# Patient Record
Sex: FEMALE | Race: OTHER | NOT HISPANIC OR LATINO | URBAN - METROPOLITAN AREA
[De-identification: names, ages, dates, MRNs, and addresses within clinical notes are randomized per-mention and may not be internally consistent; named-entity substitution may affect disease eponyms.]

---

## 2019-07-01 ENCOUNTER — EMERGENCY (EMERGENCY)
Facility: HOSPITAL | Age: 44
LOS: 1 days | Discharge: HOME | End: 2019-07-01
Attending: EMERGENCY MEDICINE
Payer: COMMERCIAL

## 2019-07-01 VITALS
WEIGHT: 160.06 LBS | HEART RATE: 112 BPM | RESPIRATION RATE: 20 BRPM | HEIGHT: 62 IN | OXYGEN SATURATION: 99 % | DIASTOLIC BLOOD PRESSURE: 85 MMHG | SYSTOLIC BLOOD PRESSURE: 120 MMHG | TEMPERATURE: 98 F

## 2019-07-01 LAB
ALBUMIN SERPL ELPH-MCNC: 4.6 G/DL — SIGNIFICANT CHANGE UP (ref 3.3–5)
ALP SERPL-CCNC: 61 U/L — SIGNIFICANT CHANGE UP (ref 40–120)
ALT FLD-CCNC: 8 U/L — LOW (ref 10–45)
ANION GAP SERPL CALC-SCNC: 12 MMOL/L — SIGNIFICANT CHANGE UP (ref 5–17)
APTT BLD: 27 SEC — LOW (ref 27.5–36.3)
AST SERPL-CCNC: 14 U/L — SIGNIFICANT CHANGE UP (ref 10–40)
BASOPHILS # BLD AUTO: 0 K/UL — SIGNIFICANT CHANGE UP (ref 0–0.2)
BASOPHILS NFR BLD AUTO: 0.1 % — SIGNIFICANT CHANGE UP (ref 0–2)
BILIRUB SERPL-MCNC: 0.5 MG/DL — SIGNIFICANT CHANGE UP (ref 0.2–1.2)
BLD GP AB SCN SERPL QL: NEGATIVE — SIGNIFICANT CHANGE UP
BUN SERPL-MCNC: 10 MG/DL — SIGNIFICANT CHANGE UP (ref 7–23)
CALCIUM SERPL-MCNC: 9.6 MG/DL — SIGNIFICANT CHANGE UP (ref 8.4–10.5)
CHLORIDE SERPL-SCNC: 105 MMOL/L — SIGNIFICANT CHANGE UP (ref 96–108)
CK MB CFR SERPL CALC: 1 NG/ML — SIGNIFICANT CHANGE UP (ref 0–3.8)
CK SERPL-CCNC: 44 U/L — SIGNIFICANT CHANGE UP (ref 25–170)
CO2 SERPL-SCNC: 22 MMOL/L — SIGNIFICANT CHANGE UP (ref 22–31)
CREAT SERPL-MCNC: 0.73 MG/DL — SIGNIFICANT CHANGE UP (ref 0.5–1.3)
EOSINOPHIL # BLD AUTO: 0 K/UL — SIGNIFICANT CHANGE UP (ref 0–0.5)
EOSINOPHIL NFR BLD AUTO: 0.3 % — SIGNIFICANT CHANGE UP (ref 0–6)
GLUCOSE SERPL-MCNC: 141 MG/DL — HIGH (ref 70–99)
HCG SERPL-ACNC: <2 MIU/ML — SIGNIFICANT CHANGE UP
HCT VFR BLD CALC: 37.7 % — SIGNIFICANT CHANGE UP (ref 34.5–45)
HGB BLD-MCNC: 12.8 G/DL — SIGNIFICANT CHANGE UP (ref 11.5–15.5)
INR BLD: 1 RATIO — SIGNIFICANT CHANGE UP (ref 0.88–1.16)
LYMPHOCYTES # BLD AUTO: 1 K/UL — SIGNIFICANT CHANGE UP (ref 1–3.3)
LYMPHOCYTES # BLD AUTO: 12.6 % — LOW (ref 13–44)
MCHC RBC-ENTMCNC: 30.5 PG — SIGNIFICANT CHANGE UP (ref 27–34)
MCHC RBC-ENTMCNC: 34 GM/DL — SIGNIFICANT CHANGE UP (ref 32–36)
MCV RBC AUTO: 89.7 FL — SIGNIFICANT CHANGE UP (ref 80–100)
MONOCYTES # BLD AUTO: 0.5 K/UL — SIGNIFICANT CHANGE UP (ref 0–0.9)
MONOCYTES NFR BLD AUTO: 6.8 % — SIGNIFICANT CHANGE UP (ref 2–14)
NEUTROPHILS # BLD AUTO: 6.2 K/UL — SIGNIFICANT CHANGE UP (ref 1.8–7.4)
NEUTROPHILS NFR BLD AUTO: 80.2 % — HIGH (ref 43–77)
PLATELET # BLD AUTO: 466 K/UL — HIGH (ref 150–400)
POTASSIUM SERPL-MCNC: 4.1 MMOL/L — SIGNIFICANT CHANGE UP (ref 3.5–5.3)
POTASSIUM SERPL-SCNC: 4.1 MMOL/L — SIGNIFICANT CHANGE UP (ref 3.5–5.3)
PROT SERPL-MCNC: 7 G/DL — SIGNIFICANT CHANGE UP (ref 6–8.3)
PROTHROM AB SERPL-ACNC: 11.5 SEC — SIGNIFICANT CHANGE UP (ref 10–12.9)
RBC # BLD: 4.21 M/UL — SIGNIFICANT CHANGE UP (ref 3.8–5.2)
RBC # FLD: 14.1 % — SIGNIFICANT CHANGE UP (ref 10.3–14.5)
RH IG SCN BLD-IMP: POSITIVE — SIGNIFICANT CHANGE UP
SODIUM SERPL-SCNC: 139 MMOL/L — SIGNIFICANT CHANGE UP (ref 135–145)
TROPONIN T, HIGH SENSITIVITY RESULT: <6 NG/L — SIGNIFICANT CHANGE UP (ref 0–51)
WBC # BLD: 7.7 K/UL — SIGNIFICANT CHANGE UP (ref 3.8–10.5)
WBC # FLD AUTO: 7.7 K/UL — SIGNIFICANT CHANGE UP (ref 3.8–10.5)

## 2019-07-01 PROCEDURE — 99218: CPT

## 2019-07-01 PROCEDURE — 70496 CT ANGIOGRAPHY HEAD: CPT | Mod: 26

## 2019-07-01 PROCEDURE — 99283 EMERGENCY DEPT VISIT LOW MDM: CPT

## 2019-07-01 PROCEDURE — 70498 CT ANGIOGRAPHY NECK: CPT | Mod: 26

## 2019-07-01 PROCEDURE — 93010 ELECTROCARDIOGRAM REPORT: CPT

## 2019-07-01 PROCEDURE — 70450 CT HEAD/BRAIN W/O DYE: CPT | Mod: 26,59

## 2019-07-01 RX ORDER — ONDANSETRON 8 MG/1
4 TABLET, FILM COATED ORAL ONCE
Refills: 0 | Status: COMPLETED | OUTPATIENT
Start: 2019-07-01 | End: 2019-07-01

## 2019-07-01 RX ORDER — SODIUM CHLORIDE 9 MG/ML
1000 INJECTION, SOLUTION INTRAVENOUS ONCE
Refills: 0 | Status: COMPLETED | OUTPATIENT
Start: 2019-07-01 | End: 2019-07-01

## 2019-07-01 RX ORDER — ACETAMINOPHEN 500 MG
975 TABLET ORAL ONCE
Refills: 0 | Status: COMPLETED | OUTPATIENT
Start: 2019-07-01 | End: 2019-07-01

## 2019-07-01 RX ORDER — DIPHENHYDRAMINE HCL 50 MG
50 CAPSULE ORAL ONCE
Refills: 0 | Status: COMPLETED | OUTPATIENT
Start: 2019-07-01 | End: 2019-07-01

## 2019-07-01 RX ORDER — KETOROLAC TROMETHAMINE 30 MG/ML
15 SYRINGE (ML) INJECTION ONCE
Refills: 0 | Status: DISCONTINUED | OUTPATIENT
Start: 2019-07-01 | End: 2019-07-01

## 2019-07-01 RX ORDER — METOCLOPRAMIDE HCL 10 MG
10 TABLET ORAL ONCE
Refills: 0 | Status: COMPLETED | OUTPATIENT
Start: 2019-07-01 | End: 2019-07-01

## 2019-07-01 RX ORDER — SODIUM CHLORIDE 9 MG/ML
3 INJECTION INTRAMUSCULAR; INTRAVENOUS; SUBCUTANEOUS EVERY 8 HOURS
Refills: 0 | Status: DISCONTINUED | OUTPATIENT
Start: 2019-07-01 | End: 2019-07-05

## 2019-07-01 RX ADMIN — Medication 10 MILLIGRAM(S): at 20:17

## 2019-07-01 RX ADMIN — Medication 15 MILLIGRAM(S): at 20:51

## 2019-07-01 RX ADMIN — ONDANSETRON 4 MILLIGRAM(S): 8 TABLET, FILM COATED ORAL at 18:50

## 2019-07-01 RX ADMIN — SODIUM CHLORIDE 1000 MILLILITER(S): 9 INJECTION, SOLUTION INTRAVENOUS at 20:23

## 2019-07-01 RX ADMIN — Medication 15 MILLIGRAM(S): at 20:18

## 2019-07-01 RX ADMIN — SODIUM CHLORIDE 1000 MILLILITER(S): 9 INJECTION, SOLUTION INTRAVENOUS at 19:02

## 2019-07-01 RX ADMIN — Medication 50 MILLIGRAM(S): at 20:18

## 2019-07-01 RX ADMIN — SODIUM CHLORIDE 3 MILLILITER(S): 9 INJECTION INTRAMUSCULAR; INTRAVENOUS; SUBCUTANEOUS at 23:12

## 2019-07-01 RX ADMIN — Medication 975 MILLIGRAM(S): at 19:02

## 2019-07-01 RX ADMIN — Medication 975 MILLIGRAM(S): at 20:17

## 2019-07-01 NOTE — ED CDU PROVIDER INITIAL DAY NOTE - NEUROLOGICAL MOTOR PLANTAR LEFT
4/5 MOVEMENT AGAINST RESISTANCE, LESS THAN NORMAL. 3/5 MOVEMENT AGAINST GRAVITY, BUT NOT AGAINST ADDED RESISTANCE.

## 2019-07-01 NOTE — ED CDU PROVIDER INITIAL DAY NOTE - ATTENDING CONTRIBUTION TO CARE
Attending MD Conti:   I personally have seen and examined this patient.  Physician assistant note reviewed and agree on plan of care and except where noted.  See HPI, PE, and MDM for details.

## 2019-07-01 NOTE — ED CDU PROVIDER INITIAL DAY NOTE - DETAILS
R/o CVA  1. Frequent reevaluations  2. Telemetry  3. Neuro checks  4. MRI head no cont  5. Neuro following  Plan d/w Dr. Conti

## 2019-07-01 NOTE — ED CDU PROVIDER DISPOSITION NOTE - CLINICAL COURSE
44 YOF pmh migraines, p/w Left sided weakness and slurred speech. Per family member pt was last seen normal 10pm last night. Around 430pm pt speech worsened and became slurred. Pt endorses a diffuse headache since this morning and feels slightly confused. Pt also endorses weakness in left arm and left leg. Pt denies fever, chills cough. Headache started this morning and has progressively worsened. Pt states this is not like her typical headache. Code stroke activated as pt has left arm drift and left sided weakness.  In ED, patient had Head CT w/o cont and CT angio head and neck negative for acute pathology. Pt was evaluated by Neurology and sent to CDU for frequent reeval, vitals q 4hrs, pain control, neuro checks, MRI head w.o contrast. 44 YOF pmh migraines, p/w Left sided weakness and slurred speech. Per family member pt was last seen normal 10pm last night. Around 430pm pt speech worsened and became slurred. Pt endorses a diffuse headache since this morning and feels slightly confused. Pt also endorses weakness in left arm and left leg. Pt denies fever, chills cough. Headache started this morning and has progressively worsened. Pt states this is not like her typical headache. Code stroke activated as pt has left arm drift and left sided weakness.  In ED, patient had Head CT w/o cont and CT angio head and neck negative for acute pathology. Pt was evaluated by Neurology and sent to CDU for frequent reeval, vitals q 4hrs, pain control, neuro checks, MRI head w.o contrast. MRI unremarkable. Pt ambulatory in CDU without any difficulty. Pt re-evaluate by neurology team and attending who report patient's exam and weakness much improved from initial evaluate. Neuro recommending medrol dose pack and eletriptan for headache. 44 YOF pmh migraines, p/w Left sided weakness and slurred speech. Per family member pt was last seen normal 10pm last night. Around 430pm pt speech worsened and became slurred. Pt endorses a diffuse headache since this morning and feels slightly confused. Pt also endorses weakness in left arm and left leg. Pt denies fever, chills cough. Headache started this morning and has progressively worsened. Pt states this is not like her typical headache. Code stroke activated as pt has left arm drift and left sided weakness.  In ED, patient had Head CT w/o cont and CT angio head and neck negative for acute pathology. Pt was evaluated by Neurology and sent to CDU for frequent reeval, vitals q 4hrs, pain control, neuro checks, MRI head w.o contrast. MRI unremarkable. Pt ambulatory in CDU without any difficulty. Pt re-evaluate by neurology team and attending who report patient's exam and weakness much improved from initial evaluation. Neuro recommending medrol dose pack and eletriptan for headache, outpatient neuro f/u.

## 2019-07-01 NOTE — ED PROVIDER NOTE - CLINICAL SUMMARY MEDICAL DECISION MAKING FREE TEXT BOX
Attending MD Conti: 44F presenting with  nausea and left sided weakness, initial last known normal was stated 430PM however further collateral from friend at bedside notes LKN 10pm the night prior. Exam does reveal slowed speech and left sided weakness, code stroke was activated in triage. Stroke team evaluating patient, plan for CT, CTA head and neck, disposition pending stroke team evaluation and neuro-imaging.

## 2019-07-01 NOTE — CONSULT NOTE ADULT - SUBJECTIVE AND OBJECTIVE BOX
HPI: 45yo woman with history of migraines with aura presenting with 1 day of headache. Step sister was at bedside and contributed to history. Patient was last well known at [facility] on . This morning 8a patient complained to step sister that she was having the "worst headache of her life" with associated nausea and 1 episode of vomiting. Patient did not  step sister's daughter, prompting step sister to call patient, who noted patient to have some slowed speech. Patient reports headache did not resolve with a dose of oral prednisone (prescribed by her PCP for headaches) and imitrex (reports this is old prescription that she has not taken in over a month, from her Neurologist that she has not seen in several months).    Reports that she has a history of migraines with auras that involve either the left or right part of her head sometimes focused around either eye with a "funny feeling" that she believes to be an aura and has visual of white light. She explained that medications she has tried in the past have not helped, including imitrex.    REVIEW OF SYSTEMS  General: + fatigue	  Skin: denies rashes	  Ophthalmologic: denies blurry or double vision  ENMT: denies excessive lacrimation	  Respiratory and Thorax:	denies shortness of breath  Cardiovascular:	denies cp  Gastrointestinal:	+ nausea, vomiting  Genitourinary: denies dysuria	  Musculoskeletal:	 see neurological  Neurological: + left sided weakness		  	  A 10-system ROS was performed and is negative except for those items noted above and/or in the HPI.    PAST MEDICAL & SURGICAL HISTORY:  C/S age 16    FAMILY HISTORY:  father,  at 42 from stroke    SOCIAL HISTORY:   T/E/D: no tobacco use, no alcohol use, no illicit drug use  Occupation: ER nurse at [employer]    MEDICATIONS (HOME):  Home Medications:  prednisone, PRN for headaches - she takes this on occasion  imitrex for migraines nasal spray - she reports this was an old prescription and that imitrex in the past has not worked for her migraines    MEDICATIONS  (STANDING):  sodium chloride 0.9% lock flush 3 milliLiter(s) IV Push every 8 hours    MEDICATIONS  (PRN):    ALLERGIES/INTOLERANCES:  Allergies  No Known Allergies    Intolerances    VITALS & EXAMINATION:  Vital Signs Last 24 Hrs  T(C): 36.8 (2019 17:44), Max: 36.8 (2019 17:44)  T(F): 98.2 (2019 17:44), Max: 98.2 (2019 17:44)  HR: 112 (2019 17:44) (112 - 112)  BP: 120/85 (2019 17:44) (120/85 - 120/85)  BP(mean): --  RR: 20 (2019 17:44) (20 - 20)  SpO2: 99% (2019 17:44) (99% - 99%)    General:  Constitutional: Obese Female, appears stated age, in no apparent distress including pain  Head: Normocephalic & atraumatic.  ENT: Patent ear canals, intact TM, mucus membranes moist & pink, neck supple, no lymphadenopathy.   Respiratory: Patent airway. All lung fields are clear to auscultation bilaterally.  Extremities: No cyanosis, clubbing, or edema.  Skin: No rashes, bruising, or discoloration.    Cardiovascular (>2): RRR no murmurs. Carotid pulsations symmetric, no bruits. Normal capillary beds refill, 1-2 seconds or less.     Neurological (>12):  NIHSS: 2 for left arm and left leg drift    MS: Awake, alert, oriented to person, place, situation, time. Normal affect. Follows all commands.    Language: Speech is clear, fluent with good repetition & comprehension. noted speech to be slowed    CNs: PERRLA (R = 3mm, L = 3mm). VFF. EOMI, no nystagmus, no diplopia. V1-3 intact to LT/pinprick, well developed masseter muscles b/l. No facial asymmetry b/l, full eye closure strength b/l. Hearing grossly normal (rubbing fingers) b/l. Symmetric palate elevation in midline. Gag reflex deferred. Head turning & shoulder shrug intact b/l. Tongue midline, normal movements, no atrophy.    Fundoscopic: not performed    Motor: Normal muscle bulk & tone. No noticeable tremor or seizure. No pronator drift.              Deltoid	Biceps	Triceps	Wrist	Finger ABd	   R	5	5	5	5	5		5 	  L	3	3	3	3	3		3    	H-Flex	H-Ext	H-ABd	H-ADd	K-Flex	K-Ext	D-Flex	P-Flex  R	5	5	5	5	5	5	5	5 	   L	3	3	3	3	3	3	3	3	     Sensation: Intact to LT/PP/Temp/Vibration/Position b/l throughout.     Cortical: Extinction on DSS (neglect): none    Reflexes:              Biceps(C5)       BR(C6)     Triceps(C7)               Patellar(L4)    Achilles(S1)    Plantar Resp  R	2	          2	             2		        2		    2		Down   L	2	          2	             2		        2		    2		Down     Coordination: intact rapid-alt movements. No dysmetria to FTN/HTS    Gait: not performed    LABORATORY:  CBC                       12.8   7.7   )-----------( 466      ( 2019 18:28 )             37.7     Chem 07-    139  |  105  |  10  ----------------------------<  141<H>  4.1   |  22  |  0.73    Ca    9.6      2019 18:28    TPro  7.0  /  Alb  4.6  /  TBili  0.5  /  DBili  x   /  AST  14  /  ALT  8<L>  /  AlkPhos  61      LFTs LIVER FUNCTIONS - ( 2019 18:28 )  Alb: 4.6 g/dL / Pro: 7.0 g/dL / ALK PHOS: 61 U/L / ALT: 8 U/L / AST: 14 U/L / GGT: x           Coagulopathy PT/INR - ( 2019 18:28 )   PT: 11.5 sec;   INR: 1.00 ratio         PTT - ( 2019 18:28 )  PTT:27.0 sec  Lipid Panel   A1c   Cardiac enzymes CARDIAC MARKERS ( 2019 18:28 )  x     / x     / 44 U/L / x     / 1.0 ng/mL      U/A   CSF  Immunological  Other    STUDIES & IMAGING:  Studies (EKG, EEG, EMG, etc):     Radiology (XR, CT, MR, U/S, TTE/ASHLEY):  < from: CT Brain Stroke Protocol (19 @ 18:06) >  IMPRESSION:     CT brain: No acute intracranial hemorrhage, mass effect, or midline   shift. No abnormal contrast enhancement.    CT angiography neck: No evidence of hemodynamically significant stenosis   by NASCET criteria. No evidence of vascular dissection.    CT angiography brain: No major vessel occlusion or proximal stenosis by   NASCET criteria. No evidence of aneurysm or other vascular malformation.    These findings were discussed with Dr. Fernandes at 2019 6:20 PM by Dr. Raymond with read back confirmation.     < from: CT Angio Head w/ IV Cont (19 @ 18:06) >  IMPRESSION:     CT brain: No acute intracranial hemorrhage, mass effect, or midline   shift. No abnormal contrast enhancement.    CT angiography neck: No evidence of hemodynamically significant stenosis   by NASCET criteria. No evidence of vascular dissection.    CT angiography brain: No major vessel occlusion or proximal stenosis by   NASCET criteria. No evidence of aneurysm or other vascular malformation.    These findings were discussed with Dr. Fernandes at 2019 6:20 PM by Dr. Raymond with read back confirmation.

## 2019-07-01 NOTE — ED PROVIDER NOTE - NS ED ROS FT
CONSTITUTIONAL: No fevers, no chills  Eyes: no visual changes  Ears: no ear drainage, no ear pain  Nose: no nasal congestion  Mouth/Throat: no sore throat  Cardiovascular: No Chest pain  Respiratory: No SOB  Gastrointestinal: No n/v/d, no abd pain  Genitourinary: no dysuria, no hematuria  SKIN: no rashes.  NEURO: +weakness, +headache

## 2019-07-01 NOTE — ED CDU PROVIDER DISPOSITION NOTE - PROVIDER TOKENS
PROVIDER:[TOKEN:[[id number]],FOLLOWUP:[1-3 Days]],PROVIDER:[TOKEN:[[id number]],FOLLOWUP:[1-3 Days]] PROVIDER:[TOKEN:[[id number]],FOLLOWUP:[1-3 Days]]

## 2019-07-01 NOTE — ED CDU PROVIDER DISPOSITION NOTE - PLAN OF CARE
As recommended by Neurology..............  Follow up with your Primary Care Physician within the next 2-3 days  Bring a copy of your test results with you to your appointment  Return to the Emergency Room if you experience new or worsening symptoms

## 2019-07-01 NOTE — ED CDU PROVIDER INITIAL DAY NOTE - OBJECTIVE STATEMENT
44 YOF pmh migraines, p/w Left sided weakness and slurred speech. Per family member pt was last seen normal 10pm last night. Around 430pm pt speech worsened and became slurred. Pt endorses a diffuse headache since this morning and feels slightly confused. Pt also endorses weakness in left arm and left leg. Pt denies fever, chills cough. Headache started this morning and has progressively worsened. Pt states this is not like her typical headache. Code stroke activated as pt has left arm drift and left sided weakness.  In ED, patient had Head CT w/o cont and CT angio head and neck negative for acute pathology. Pt was evaluated by Neurology and sent to CDU for frequent reeval, vitals q 4hrs, pain control, neuro checks, MRI head w.o contrast.

## 2019-07-01 NOTE — ED CDU PROVIDER DISPOSITION NOTE - ATTENDING CONTRIBUTION TO CARE
Patient with hemiplegia and migraine consistent with hemiplegic migraine after negative MRI and improving symptoms, patient to start steroids and eletriptan as per neurology recommendations.  Patient serially evaluated throughout emergency department course. There was no acute deterioration up to this time in the department. Patient has demonstrated marked clinical improvement, feels better at this time according to emergency department team. Agree with goals/plan of emergency department care as described in electronic medical record, including diagnostics, therapeutics and consultation as clinically warranted. Will discharge home with close outpatient followup with primary care physician/provider and specialist if necessary. Patient educated on concerning signs and features to return to the emergency department including but not limited to: nausea, vomiting, fever, chills, persistent/worsening symptoms or any concerns at all.  No immediate life threatening issues present on history or clinical exam. Patient is a safe disposition home, has capacity and insight into their condition, is ambulatory in the Emergency Department with no further questions and will follow up with their doctor(s) this week. Patient understands anticipatory guidance and was given strict return and follow up precautions. The patient has been informed of all concerning signs and symptoms to return to Emergency Department, the necessity to follow up with the PMD/Clinic/follow up provided within 2-3 days was explained, and the patient reports understanding of above with capacity and insight.

## 2019-07-01 NOTE — CONSULT NOTE ADULT - ATTENDING COMMENTS
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     Exam with mild photophobia;  cn 2-12 currently intact.  no focal weakness except ? mild left hand drift.  No other sensory change or cerebellar dysfunction.    Impression: left sided weakness associated migraine  MRI without acute findings  steroid taper  consider change in triptan to Relpax

## 2019-07-01 NOTE — ED PROVIDER NOTE - PROGRESS NOTE DETAILS
Attending MD Conti: neurology recommends CDU for MR brain. CT/CTA negative for acute pathology CTs unremarkable, pt strength improving, headache improving. Speech now back to baseline. Will cdu for MRI.

## 2019-07-01 NOTE — ED CDU PROVIDER DISPOSITION NOTE - NSFOLLOWUPCLINICS_GEN_ALL_ED_FT
[facility]  Neurology  [address] [place] [address] [address] [place] [address]  Phone: [phone]  Fax:   Follow Up Time: 1-3 Days

## 2019-07-01 NOTE — ED CDU PROVIDER DISPOSITION NOTE - CARE PROVIDERS DIRECT ADDRESSES
[email or web address]

## 2019-07-01 NOTE — ED CDU PROVIDER DISPOSITION NOTE - CARE PROVIDER_API CALL
Chong Long (MD)  Neurology; Pain Medicine; Psychiatry  [address] [facility] [facility] [address]  [place] [address]  Phone: [phone]  Fax: [phone]  Follow Up Time: 1-3 Days    Dominick Ruth)  Internal Medicine  [address] [place] [address]  Phone: [phone]  Fax: [phone]  Follow Up Time: 1-3 Days Chong Long (MD)  Neurology; Pain Medicine; Psychiatry  [address] [facility] [facility] [address]  [place] [address]  Phone: [phone]  Fax: [phone]  Follow Up Time: 1-3 Days

## 2019-07-01 NOTE — ED PROVIDER NOTE - OBJECTIVE STATEMENT
44 YOF pmh migraines, p/w Left sided weakness and slurred speech. Per family member pt was last seen normal 10pm last night. Around 430pm pt speech worsened and became slurred. Pt endorses a diffuse headache since this morning and feels slightly confused. Pt also endorses weakness in left arm and left leg. Pt denies fever, chills cough. Headache started this morning and has progressively worsened. Pt states this is not like her typical headache. Code stroke activated as pt has left arm drift and left sided weakness.

## 2019-07-01 NOTE — ED PROVIDER NOTE - ATTENDING CONTRIBUTION TO CARE
Attending MD Conti:  I personally have seen and examined this patient.  Resident note reviewed and agree on plan of care and except where noted.  See HPI, PE, and MDM for details.

## 2019-07-01 NOTE — ED CDU PROVIDER INITIAL DAY NOTE - PROGRESS NOTE DETAILS
CDU PROGRESS NOTE TOMMY ROSALES: Pt resting comfortably, aox3, speaking coherently, NAD, VSS. No events on telemetry. Neuro exam + left arm and left leg drift with 3/5 strength. Neurology aware, Will continue to monitor, plan for MRI without contrast brain.

## 2019-07-01 NOTE — CONSULT NOTE ADULT - ASSESSMENT
Assessment: 45yo woman with history of migraines with auras presenting with one day of severe headache with associated left sided weakness and nausea and vomiting admitted to CDU for hemiplegic migraine versus ischemic stroke.    Impression: left sided weakness 2/2 hemiplegic migraine versus ischemic stroke      Plan:  Imaging/Studies:   MRI brain w/o IV contrast     Meds: Reglan PRN for nausea  Tylenol PRN for headache    Recommendations:  -MRI brain w/o IV contrast  -Reglan for nausea  -Tylenol for headache      -Management & disposition to be discussed with neuro attending, Dr. Long, in AM

## 2019-07-01 NOTE — ED ADULT NURSE NOTE - OBJECTIVE STATEMENT
45 y/o female presents to ed c/o disorientation since 10 pm yesterday. 43 y/o female presents to ed c/o disorientation, left arm weakness and slurred speech since 10 pm yesterday. C/o nausea. Denies chest pain, sob, ha, n/v/d, abdominal pain, f/c, urinary symptoms, hematuria. A&Ox4, vss, skin warm dry and intact, MAEx4, lungs CTA, abd soft nondistended. Pt on presentation had slurred speech, no longer. Patient's bed in the lowest position, explained plan of care to patient and family members. Will continue to reassess.

## 2019-07-01 NOTE — ED CDU PROVIDER DISPOSITION NOTE - NSFOLLOWUPINSTRUCTIONS_ED_ALL_ED_FT
Stay well hydrated.  As recommended by Neurology for headache, take Eletriptan 20mg tabs orally as directed: 1-2 tabs as a single dose (max: 40 mg/dose); if the headache improves but returns, dose may be repeated after 2 hours (max: 80 mg/day).  Please also take Medrol dosepack as prescribed.  Please follow up with Neurologist, Dr. Long, upon discharge.   Follow up with your primary care provider.   Return to ER for any new/worsening headache, vision changes, numbness/tingling, weakness, trouble walking/speaking/swallowing, or any other concerns. Stay well hydrated.  As recommended by Neurology for headache, take Eletriptan 20mg tabs orally as directed: 1-2 tabs as a single dose (max: 40 mg/dose); if the headache improves but returns, dose may be repeated after 2 hours (max: 80 mg/day).  Please also take Medrol dosepack as prescribed.  Please follow up with Neurologist, Dr. Long, upon discharge. Information provided.  Follow up with your primary care provider.   Return to ER for any new/worsening headache, vision changes, numbness/tingling, weakness, trouble walking/speaking/swallowing, or any other concerns.

## 2019-07-02 VITALS
OXYGEN SATURATION: 99 % | TEMPERATURE: 98 F | HEART RATE: 70 BPM | RESPIRATION RATE: 16 BRPM | SYSTOLIC BLOOD PRESSURE: 106 MMHG | DIASTOLIC BLOOD PRESSURE: 73 MMHG

## 2019-07-02 PROCEDURE — 70551 MRI BRAIN STEM W/O DYE: CPT | Mod: 26

## 2019-07-02 PROCEDURE — 99217: CPT

## 2019-07-02 RX ORDER — SUMATRIPTAN SUCCINATE 4 MG/.5ML
50 INJECTION, SOLUTION SUBCUTANEOUS ONCE
Refills: 0 | Status: COMPLETED | OUTPATIENT
Start: 2019-07-02 | End: 2019-07-02

## 2019-07-02 RX ORDER — ELETRIPTAN HYDROBROMIDE 20 MG/1
1 TABLET, FILM COATED ORAL
Qty: 10 | Refills: 0
Start: 2019-07-02

## 2019-07-02 RX ORDER — ACETAMINOPHEN 500 MG
975 TABLET ORAL ONCE
Refills: 0 | Status: COMPLETED | OUTPATIENT
Start: 2019-07-02 | End: 2019-07-02

## 2019-07-02 RX ORDER — KETOROLAC TROMETHAMINE 30 MG/ML
15 SYRINGE (ML) INJECTION ONCE
Refills: 0 | Status: DISCONTINUED | OUTPATIENT
Start: 2019-07-02 | End: 2019-07-02

## 2019-07-02 RX ADMIN — Medication 975 MILLIGRAM(S): at 08:41

## 2019-07-02 RX ADMIN — SODIUM CHLORIDE 3 MILLILITER(S): 9 INJECTION INTRAMUSCULAR; INTRAVENOUS; SUBCUTANEOUS at 06:11

## 2019-07-02 RX ADMIN — Medication 15 MILLIGRAM(S): at 08:40

## 2019-07-02 RX ADMIN — SUMATRIPTAN SUCCINATE 50 MILLIGRAM(S): 4 INJECTION, SOLUTION SUBCUTANEOUS at 12:46

## 2019-07-02 RX ADMIN — Medication 60 MILLIGRAM(S): at 12:41

## 2019-07-02 NOTE — ED CDU PROVIDER SUBSEQUENT DAY NOTE - HISTORY
CDU PROGRESS NOTE PA BOBBY: No interval change from previous note. Pt resting comfortably, aox3, speaking coherently, NAD, VSS. No events on telemetry. Neuro exam + left arm and left leg drift with 3/5 strength. Neurology aware, Will continue to monitor, plan for MRI without contrast brain.

## 2019-07-02 NOTE — ED CDU PROVIDER SUBSEQUENT DAY NOTE - MEDICAL DECISION MAKING DETAILS
See observation monitoring plan section from initial day note.  left arm and left leg 4+/5, normal speech without errors or dysarthria  patient has MRI pending today, history of migraines and headache currently mildly improved.

## 2019-07-02 NOTE — ED CDU PROVIDER SUBSEQUENT DAY NOTE - ATTENDING CONTRIBUTION TO CARE
Patient  understands anticipatory guidance and was given strict return and follow up precautions. The patient has been informed of all concerning signs and symptoms to return to Emergency Department, the necessity to follow up with PMD/Clinic/follow up provided within 2-3 days was explained, and the patient reports understanding of above with capacity and insight if patient disposition is to be home.

## 2019-07-02 NOTE — ED ADULT NURSE REASSESSMENT NOTE - NURSING NEURO LEVEL OF CONSCIOUSNESS
alert and awake/follows commands

## 2019-07-02 NOTE — ED ADULT NURSE REASSESSMENT NOTE - NSIMPLEMENTINTERV_GEN_ALL_ED
Implemented All Universal Safety Interventions:  [place] to call system. Call bell, personal items and telephone within reach. Instruct patient to call for assistance. Room bathroom lighting operational. Non-slip footwear when patient is off stretcher. Physically safe environment: no spills, clutter or unnecessary equipment. Stretcher in lowest position, wheels locked, appropriate side rails in place.

## 2019-07-02 NOTE — ED ADULT NURSE REASSESSMENT NOTE - NS ED NURSE REASSESS COMMENT FT1
13.00 Pt is reviewed by CDU MD Sade Anand  with all the results. Pt continues to have headache medicated  as per order. Pt do not want to wait for further observation.   pt is discharged ML out.  TOMMY Ordoñez explained the follow up care & gave the discharge summary. Pt verbalized the understanding on follow up care . Pt stable to go home.
Pt AO4. Neuro check intact except 3+ weakness noted on L arm & L leg. Pt states headache is present but not as bad as previously. No other deficits noted. Safety maintained. Will continue to monitor.
Pt AO4. Neuro check intact except 4/5 L sided weakness noted. No drift noted on L arm and pt able to hold L leg with more ease although drift noted. Pt states headache is present but not severe. Pt states she feels like weakness has improved tremendously. No other deficits noted. Safety maintained. Will continue to monitor.
Pt received from RODNEY Banda. Pt oriented to CDU & plan of care was discussed. Pt AO4. Neuro check intact except pt states she is experiencing weakness throughout the body but more on L side. Poor effort noted during neuro check. 2+/5 weakness noted in L arm & L leg. Drift noted in L arm & L leg but pt had difficulty bringing L leg up and keeping it elevated. Pt denies any headache at the moment. Pt denies any blurry/ double vision or any other symptoms. PA Amish aware. No other deficits noted. Safety & comfort measures maintained. Call bell in reach. Will continue to monitor.
07.00 Am  Pt received from RODNEY Cee pt is observed for acute headache & left sided weakness. pt is here for MRI.   09.00  Am  Pt reassessed after the MRI Pt oriented to CDU & plan of care was discussed. Pt is  AXOX4. Neuro check intact except pt states she is feeling better with weakness but has severe headache on left side more on temporal region.  Pt medicated as per order .denies any blurry/ double vision N/V/D fever chills CP SOB . Safety & comfort measures maintained. Call bell in reach. Pt advised to call for help if needed IV  site looks clean &Dry no signs of infiltration noted  Will continue to monitor.

## 2019-07-02 NOTE — ED CDU PROVIDER SUBSEQUENT DAY NOTE - PROGRESS NOTE DETAILS
Tried calling pt several times and finally spoke to pt and asked if he was able to send a photo of the bx proven skin cancer BCC left posterior neck. Pt stated that he has a lot going on and will get it to us as soon as possible. Tried giving pt my email address to send photo but pt stated that he has Ryann email and will send it to her.   Pt taken to MRI during signout prior to my evaluation. Will evaluate upon pt's return to CDU. Pt seen and evaluated at bedside upon arrival back to CDU. Pt reports sharp L frontotemporal HA similar to yesterday. Reports she still feels LUE is weaker than RUE, however feels improved. Denies any vision changes, numbness/tingling, unsteady gait. Exam remarkable for decreased strength LUE 4/5 and mild L pronator drift; RUE and b/l lower extremity strength Pt seen and evaluated at bedside upon arrival back to CDU. Pt reports sharp L frontotemporal HA similar to yesterday, reports "nothing has helped." Reports she still feels LUE/LLL is weaker than R side, however feels weakness has improved. Denies any vision changes, numbness/tingling, unsteady gait. VSS. No events on tele. Exam remarkable for decreased strength LUE and LLE 4/5 and mild L pronator drift; RUE/RLE extremity strength 5/5.  Will give tylenol/toradol and re-evaluate. Neuro team at bedside. Reports MRI WNL. Per neuro, will give final recs when MRI report is back. Pt reports persistent HA and wishes to go home. Pt ambulatory in ED without difficulty. Spoke with neuro resident who reports pt's exam and L sided weakness gradually improved since initial evaluation yesterday. MRI unremarkable. Neuro recommending prednisone 60mg now and d/c with medrol dosepack, also recommending eletriptan which is not carried at [facility] so ok with imitrex dose here. Pt seen and evaluated at bedside upon arrival back to CDU. Pt reports sharp L frontotemporal HA similar to yesterday, reports "nothing has helped." Reports she still feels LUE/LLL is weaker than R side, however feels weakness has improved. Denies any vision changes, numbness/tingling, unsteady gait. VSS. No events on tele. Exam remarkable for decreased strength LUE and LLE 4/5 and mild L pronator drift; RUE/RLE extremity strength 5/5. Speech clear. Will give tylenol/toradol and re-evaluate. Neuro team at bedside. Reports MRI reviewed and WNL. Per neuro, will give final recs when MRI report is back. MRI unremarkable. Pt reports persistent HA despite tylenol and toradol and wishes to go home. Pt ambulatory in ED without difficulty. Spoke with neuro resident who reports pt's exam and L sided weakness gradually improved since initial evaluation yesterday. Neuro recommending prednisone 60mg now and d/c with medrol dosepack, also recommending eletriptan which is not carried at [facility] so ok with imitrex dose here. Pt requesting to go home and f/u with neuro as outpatient. Stable for d/c. D/w Dr. Stuart.

## 2020-01-01 NOTE — ED CDU PROVIDER SUBSEQUENT DAY NOTE - NS ED MD PROGRESS NOTE PROVIDER NAME3 FT
Interval history reviewed, patient examined.      2d infant [ ]   [x ] C/S        History   Well infant, term, appropriate for gestational age, ready for discharge   Unremarkable nursery course   Infant is doing well.  No active medical issues. Voiding and stooling well.    Physical Examination    Weight today:3010g  Overall weight change of   7    %    General Appearance: comfortable, no distress, no dysmorphic features  Head: normocephalic, anterior fontanelle open and flat  Eyes/ENT: red reflex present b/l, palate intact  Neck/Clavicles: no masses, no crepitus  Chest: no grunting, flaring or retractions  CV: RRR, nl S1 S2, no murmurs, well perfused. Femoral pulses 2+  Abdomen: soft, non-distended, no masses, no organomegaly  : [ x] normal female  [ ] normal male, testes descended b/l  Ext: Full range of motion. No hip click. Normal digits.  Neuro: good tone, moves all extremities well, symmetric mike, +suck,+ grasp.  Skin: no lessions, no Jaundice    Blood type A+  Hearing screen passed  CHD passed Hep B vaccine [x ] given  [ ] to be given at PMD  Bilirubin [ x] TCB  [ ] serum  8.8        @  42       hours of age    Assesment:  Well baby ready for discharge
- Carmenza Ordoñez PA-C

## 2020-11-11 ENCOUNTER — TRANSCRIPTION ENCOUNTER (OUTPATIENT)
Age: 45
End: 2020-11-11

## 2020-11-13 ENCOUNTER — APPOINTMENT (OUTPATIENT)
Dept: NEUROLOGY | Facility: CLINIC | Age: 45
End: 2020-11-13
Payer: COMMERCIAL

## 2020-11-13 VITALS
BODY MASS INDEX: 21.53 KG/M2 | WEIGHT: 117 LBS | SYSTOLIC BLOOD PRESSURE: 118 MMHG | HEIGHT: 62 IN | DIASTOLIC BLOOD PRESSURE: 80 MMHG | HEART RATE: 77 BPM

## 2020-11-13 DIAGNOSIS — G43.109 MIGRAINE WITH AURA, NOT INTRACTABLE, W/OUT STATUS MIGRAINOSUS: ICD-10-CM

## 2020-11-13 DIAGNOSIS — Z82.0 FAMILY HISTORY OF EPILEPSY AND OTHER DISEASES OF THE NERVOUS SYSTEM: ICD-10-CM

## 2020-11-13 DIAGNOSIS — G44.009 CLUSTER HEADACHE SYNDROME, UNSPECIFIED, NOT INTRACTABLE: ICD-10-CM

## 2020-11-13 DIAGNOSIS — Z78.9 OTHER SPECIFIED HEALTH STATUS: ICD-10-CM

## 2020-11-13 PROBLEM — Z00.00 ENCOUNTER FOR PREVENTIVE HEALTH EXAMINATION: Noted: 2020-11-13

## 2020-11-13 PROCEDURE — 99205 OFFICE O/P NEW HI 60 MIN: CPT

## 2020-11-13 RX ORDER — ALPRAZOLAM 0.5 MG/1
0.5 TABLET ORAL
Refills: 0 | Status: ACTIVE | COMMUNITY

## 2020-11-13 RX ORDER — ONABOTULINUMTOXINA 200 [USP'U]/1
200 INJECTION, POWDER, LYOPHILIZED, FOR SOLUTION INTRADERMAL; INTRAMUSCULAR
Refills: 0 | Status: ACTIVE | COMMUNITY

## 2020-11-16 ENCOUNTER — TRANSCRIPTION ENCOUNTER (OUTPATIENT)
Age: 45
End: 2020-11-16

## 2020-11-16 NOTE — PHYSICAL EXAM
[Person] : oriented to person [Place] : oriented to place [Time] : oriented to time [Concentration Intact] : normal concentrating ability [Visual Intact] : visual attention was ~T not ~L decreased [Naming Objects] : no difficulty naming common objects [Repeating Phrases] : no difficulty repeating a phrase [Writing A Sentence] : no difficulty writing a sentence [Fluency] : fluency intact [Comprehension] : comprehension intact [Reading] : reading intact [Past History] : adequate knowledge of personal past history [Cranial Nerves Optic (II)] : visual acuity intact bilaterally,  visual fields full to confrontation, pupils equal round and reactive to light [Cranial Nerves Oculomotor (III)] : extraocular motion intact [Cranial Nerves Trigeminal (V)] : facial sensation intact symmetrically [Cranial Nerves Facial (VII)] : face symmetrical [Cranial Nerves Vestibulocochlear (VIII)] : hearing was intact bilaterally [Cranial Nerves Glossopharyngeal (IX)] : tongue and palate midline [Cranial Nerves Accessory (XI - Cranial And Spinal)] : head turning and shoulder shrug symmetric [Cranial Nerves Hypoglossal (XII)] : there was no tongue deviation with protrusion [Motor Tone] : muscle tone was normal in all four extremities [Motor Strength] : muscle strength was normal in all four extremities [No Muscle Atrophy] : normal bulk in all four extremities [Sensation Tactile Decrease] : light touch was intact [Abnormal Walk] : normal gait [Balance] : balance was intact [2+] : Ankle jerk left 2+ [Motor Handedness Right-Handed] : the patient is right hand dominant [General Appearance - Alert] : alert [General Appearance - In No Acute Distress] : in no acute distress [Oriented To Time, Place, And Person] : oriented to person, place, and time [Affect] : the affect was normal [Past-pointing] : there was no past-pointing [Tremor] : no tremor present [Plantar Reflex Right Only] : normal on the right [Plantar Reflex Left Only] : normal on the left

## 2020-11-16 NOTE — HISTORY OF PRESENT ILLNESS
[FreeTextEntry1] :  \par \par She was seeing Dr saleem,Neurologist and is here to establish care, \par She reports she was initially diagnosed with  cluster headaches 8years ago, but has been experiencing HA since age 25.\par \par They start in her left eye as sharp pain then radiates to back of head. associated with nausea, vomiting, change in activity. She can get an aura as halo in her peripheral vision.\par Rpeorts during ovulation until  when  she gets  her menstrual cycle she can have a headache which is usually 15 days. Since she has been on emgality, the HA are milld-  were was worse prior to emgality. notes towards the end of the month they can start again. \par  \par  \par \par She reports she been on emgality since 9.2019 and finds that it is very effective once she takes it on time. she is takes Fioricet prn and notes certain brands work better than some.\par has been doing botox for 10 years-last received 1.5yrs. \par \par Triggers: scents, bright lights, noise, magnesium, hydroxzine \par Sleep: fragmented can wake with headache\par Hydration; water-3-8oz, caffeine cup of coffee, 1pepsi-8 0z\par Mood: stable. \par \par \par Meds: \par emgality 300mg  monthly \par difclofenic\par Relpax 40mg \par firocet  prn

## 2020-11-16 NOTE — REVIEW OF SYSTEMS
[Cluster Headache] : cluster headaches [As Noted in HPI] : as noted in HPI [Negative] : Cardiovascular

## 2020-11-16 NOTE — ASSESSMENT
[FreeTextEntry1] : 45 Year old female with known h/o cluster headaches, migraines on emgality , Replax here to establish care and resume ?botox therapy \par will renew meds today. cautioned with overuse of Fioricet \par -Emgality 300mg monthly \par -advised i will not write alprazolam and to continue to get from PMD, gabapentin discussed\par \par \par  Reference #: [id number]- alprazolam 0.5mg #90 7.2020 Dr Frye\par

## 2020-11-16 NOTE — REASON FOR VISIT
[Initial Eval - Existing Diagnosis] : an initial evaluation of an existing diagnosis [FreeTextEntry1] : Cluster migraines

## 2020-11-25 ENCOUNTER — APPOINTMENT (OUTPATIENT)
Dept: ULTRASOUND IMAGING | Facility: CLINIC | Age: 45
End: 2020-11-25

## 2020-12-02 ENCOUNTER — TRANSCRIPTION ENCOUNTER (OUTPATIENT)
Age: 45
End: 2020-12-02

## 2021-02-06 ENCOUNTER — TRANSCRIPTION ENCOUNTER (OUTPATIENT)
Age: 46
End: 2021-02-06

## 2021-02-25 ENCOUNTER — APPOINTMENT (OUTPATIENT)
Dept: ORTHOPEDIC SURGERY | Facility: CLINIC | Age: 46
End: 2021-02-25
Payer: COMMERCIAL

## 2021-02-25 VITALS
DIASTOLIC BLOOD PRESSURE: 72 MMHG | HEIGHT: 62 IN | HEART RATE: 85 BPM | WEIGHT: 118 LBS | BODY MASS INDEX: 21.71 KG/M2 | SYSTOLIC BLOOD PRESSURE: 107 MMHG

## 2021-02-25 PROCEDURE — 20610 DRAIN/INJ JOINT/BURSA W/O US: CPT | Mod: RT

## 2021-02-25 PROCEDURE — 99072 ADDL SUPL MATRL&STAF TM PHE: CPT

## 2021-02-25 PROCEDURE — 73030 X-RAY EXAM OF SHOULDER: CPT | Mod: RT

## 2021-02-25 PROCEDURE — 99203 OFFICE O/P NEW LOW 30 MIN: CPT | Mod: 25

## 2021-03-12 ENCOUNTER — APPOINTMENT (OUTPATIENT)
Dept: ORTHOPEDIC SURGERY | Facility: CLINIC | Age: 46
End: 2021-03-12
Payer: COMMERCIAL

## 2021-03-12 DIAGNOSIS — M75.41 IMPINGEMENT SYNDROME OF RIGHT SHOULDER: ICD-10-CM

## 2021-03-12 DIAGNOSIS — M67.911 UNSPECIFIED DISORDER OF SYNOVIUM AND TENDON, RIGHT SHOULDER: ICD-10-CM

## 2021-03-12 DIAGNOSIS — M75.51 BURSITIS OF RIGHT SHOULDER: ICD-10-CM

## 2021-03-12 PROCEDURE — 99213 OFFICE O/P EST LOW 20 MIN: CPT

## 2021-03-12 PROCEDURE — 99072 ADDL SUPL MATRL&STAF TM PHE: CPT

## 2021-03-19 ENCOUNTER — APPOINTMENT (OUTPATIENT)
Dept: MRI IMAGING | Facility: CLINIC | Age: 46
End: 2021-03-19
Payer: COMMERCIAL

## 2021-03-19 ENCOUNTER — OUTPATIENT (OUTPATIENT)
Dept: OUTPATIENT SERVICES | Facility: HOSPITAL | Age: 46
LOS: 1 days | End: 2021-03-19
Payer: COMMERCIAL

## 2021-03-19 DIAGNOSIS — M75.41 IMPINGEMENT SYNDROME OF RIGHT SHOULDER: ICD-10-CM

## 2021-03-19 PROCEDURE — 73221 MRI JOINT UPR EXTREM W/O DYE: CPT

## 2021-03-19 PROCEDURE — 73221 MRI JOINT UPR EXTREM W/O DYE: CPT | Mod: 26,RT

## 2021-03-23 ENCOUNTER — TRANSCRIPTION ENCOUNTER (OUTPATIENT)
Age: 46
End: 2021-03-23

## 2021-04-01 DIAGNOSIS — M79.603 PAIN IN ARM, UNSPECIFIED: ICD-10-CM

## 2021-04-07 ENCOUNTER — APPOINTMENT (OUTPATIENT)
Dept: ORTHOPEDIC SURGERY | Facility: CLINIC | Age: 46
End: 2021-04-07
Payer: COMMERCIAL

## 2021-04-07 DIAGNOSIS — M75.01 ADHESIVE CAPSULITIS OF RIGHT SHOULDER: ICD-10-CM

## 2021-04-07 PROCEDURE — 99072 ADDL SUPL MATRL&STAF TM PHE: CPT

## 2021-04-07 PROCEDURE — 99213 OFFICE O/P EST LOW 20 MIN: CPT

## 2021-05-20 DIAGNOSIS — Z01.818 ENCOUNTER FOR OTHER PREPROCEDURAL EXAMINATION: ICD-10-CM

## 2021-05-21 ENCOUNTER — APPOINTMENT (OUTPATIENT)
Dept: DISASTER EMERGENCY | Facility: CLINIC | Age: 46
End: 2021-05-21

## 2021-05-27 DIAGNOSIS — K21.9 GASTRO-ESOPHAGEAL REFLUX DISEASE W/OUT ESOPHAGITIS: ICD-10-CM

## 2021-05-27 DIAGNOSIS — Z80.0 FAMILY HISTORY OF MALIGNANT NEOPLASM OF DIGESTIVE ORGANS: ICD-10-CM

## 2021-05-27 DIAGNOSIS — Z12.11 ENCOUNTER FOR SCREENING FOR MALIGNANT NEOPLASM OF COLON: ICD-10-CM

## 2021-05-27 DIAGNOSIS — R10.13 EPIGASTRIC PAIN: ICD-10-CM

## 2021-06-04 ENCOUNTER — APPOINTMENT (OUTPATIENT)
Dept: GASTROENTEROLOGY | Facility: HOSPITAL | Age: 46
End: 2021-06-04

## 2021-07-20 DIAGNOSIS — J01.90 ACUTE SINUSITIS, UNSPECIFIED: ICD-10-CM

## 2021-07-26 ENCOUNTER — RESULT REVIEW (OUTPATIENT)
Age: 46
End: 2021-07-26

## 2021-07-26 ENCOUNTER — OUTPATIENT (OUTPATIENT)
Dept: OUTPATIENT SERVICES | Facility: HOSPITAL | Age: 46
LOS: 1 days | End: 2021-07-26
Payer: COMMERCIAL

## 2021-07-26 ENCOUNTER — APPOINTMENT (OUTPATIENT)
Dept: GASTROENTEROLOGY | Facility: HOSPITAL | Age: 46
End: 2021-07-26

## 2021-07-26 VITALS
SYSTOLIC BLOOD PRESSURE: 101 MMHG | RESPIRATION RATE: 16 BRPM | HEART RATE: 88 BPM | OXYGEN SATURATION: 100 % | DIASTOLIC BLOOD PRESSURE: 78 MMHG

## 2021-07-26 VITALS
WEIGHT: 125 LBS | DIASTOLIC BLOOD PRESSURE: 71 MMHG | RESPIRATION RATE: 15 BRPM | OXYGEN SATURATION: 100 % | TEMPERATURE: 97 F | SYSTOLIC BLOOD PRESSURE: 109 MMHG | HEART RATE: 78 BPM | HEIGHT: 62 IN

## 2021-07-26 DIAGNOSIS — Z80.0 FAMILY HISTORY OF MALIGNANT NEOPLASM OF DIGESTIVE ORGANS: ICD-10-CM

## 2021-07-26 DIAGNOSIS — Z12.11 ENCOUNTER FOR SCREENING FOR MALIGNANT NEOPLASM OF COLON: ICD-10-CM

## 2021-07-26 DIAGNOSIS — R10.13 EPIGASTRIC PAIN: ICD-10-CM

## 2021-07-26 DIAGNOSIS — K21.9 GASTRO-ESOPHAGEAL REFLUX DISEASE WITHOUT ESOPHAGITIS: ICD-10-CM

## 2021-07-26 PROCEDURE — 45380 COLONOSCOPY AND BIOPSY: CPT

## 2021-07-26 PROCEDURE — 88305 TISSUE EXAM BY PATHOLOGIST: CPT | Mod: 26

## 2021-07-26 PROCEDURE — 45380 COLONOSCOPY AND BIOPSY: CPT | Mod: 33

## 2021-07-26 PROCEDURE — 88305 TISSUE EXAM BY PATHOLOGIST: CPT

## 2021-07-26 RX ORDER — SODIUM CHLORIDE 9 MG/ML
500 INJECTION INTRAMUSCULAR; INTRAVENOUS; SUBCUTANEOUS
Refills: 0 | Status: COMPLETED | OUTPATIENT
Start: 2021-07-26 | End: 2021-07-26

## 2021-07-26 RX ADMIN — SODIUM CHLORIDE 30 MILLILITER(S): 9 INJECTION INTRAMUSCULAR; INTRAVENOUS; SUBCUTANEOUS at 13:15

## 2021-07-26 NOTE — PRE PROCEDURE NOTE - PRE PROCEDURE EVALUATION
Attending Physician:        Sanchez Lee MD                    Procedure:    Indication for Procedure: screening  ________________________________________________________  PAST MEDICAL & SURGICAL HISTORY:    ALLERGIES:  No Known Allergies    HOME MEDICATIONS:    AICD/PPM: [ ] yes   [x ] no    PERTINENT LAB DATA:                      PHYSICAL EXAMINATION:    T(C): --  HR: --  BP: --  RR: --  SpO2: --    Constitutional: NAD  HEENT: PERRLA, EOMI,    Neck:  No JVD  Respiratory: CTAB/L  Cardiovascular: S1 and S2  Gastrointestinal: BS+, soft, NT/ND  Extremities: No peripheral edema  Neurological: A/O x 3, no focal deficits  Psychiatric: Normal mood, normal affect  Skin: No rashes    ASA Class: I [ ]  II [ x]  III [ ]  IV [ ]    COMMENTS:    The patient is a suitable candidate for the planned procedure unless box checked [ ]  No, explain:

## 2021-07-26 NOTE — PRE-ANESTHESIA EVALUATION ADULT - NSANTHOSAYNRD_GEN_A_CORE
No. TONEY screening performed.  STOP BANG Legend: 0-2 = LOW Risk; 3-4 = INTERMEDIATE Risk; 5-8 = HIGH Risk

## 2021-07-26 NOTE — ASU PATIENT PROFILE, ADULT - DATE OF LAST VACCINATION
[id number] - [place], [address] [phone], [phone] - Cristina Delvalle is calling because the insurance company want the  To request the high dollar review with them on the [employer]. 29-Jan-2021

## 2021-07-27 LAB — SURGICAL PATHOLOGY STUDY: SIGNIFICANT CHANGE UP

## 2021-07-29 ENCOUNTER — NON-APPOINTMENT (OUTPATIENT)
Age: 46
End: 2021-07-29

## 2021-08-24 LAB
M TB IFN-G BLD-IMP: NEGATIVE
QUANTIFERON TB PLUS MITOGEN MINUS NIL: 6.12 IU/ML
QUANTIFERON TB PLUS NIL: 0.02 IU/ML
QUANTIFERON TB PLUS TB1 MINUS NIL: 0 IU/ML
QUANTIFERON TB PLUS TB2 MINUS NIL: 0 IU/ML

## 2021-10-14 ENCOUNTER — EMERGENCY (EMERGENCY)
Facility: HOSPITAL | Age: 46
LOS: 1 days | Discharge: HOME | End: 2021-10-14
Attending: EMERGENCY MEDICINE
Payer: COMMERCIAL

## 2021-10-14 VITALS
HEART RATE: 84 BPM | WEIGHT: 125 LBS | DIASTOLIC BLOOD PRESSURE: 79 MMHG | HEIGHT: 62 IN | OXYGEN SATURATION: 100 % | RESPIRATION RATE: 18 BRPM | SYSTOLIC BLOOD PRESSURE: 111 MMHG | TEMPERATURE: 98 F

## 2021-10-14 PROBLEM — G43.909 MIGRAINE, UNSPECIFIED, NOT INTRACTABLE, WITHOUT STATUS MIGRAINOSUS: Chronic | Status: ACTIVE | Noted: 2021-07-26

## 2021-10-14 PROCEDURE — 99284 EMERGENCY DEPT VISIT MOD MDM: CPT

## 2021-10-14 PROCEDURE — 99283 EMERGENCY DEPT VISIT LOW MDM: CPT

## 2021-10-14 NOTE — ED PROVIDER NOTE - OBJECTIVE STATEMENT
45 y/o F w/ hx of migraines presents due to needle stick at occurred after giving flu shot yesterday 4pm. Was using a 25g needle. Pt states her last titer check for hep b did result in immune results (has adequate titers). States she wanted to come to the ED yesterday but line was too long.

## 2021-10-14 NOTE — ED PROVIDER NOTE - CLINICAL SUMMARY MEDICAL DECISION MAKING FREE TEXT BOX
47 y/o F w/ hx of migraines presents due to needle stick that occurred yesterday at 4pm. Pt was administering flu shot when she had a needle stick to R thigh w/ 25g needle. Pt states she has hep b titers. States the patient getting the flu shot is known to their medical practice and has no hx of hep b/c or hiv. Discussed risk / benefits of PEP and pt declines PEP.

## 2021-10-14 NOTE — ED PROVIDER NOTE - ATTENDING CONTRIBUTION TO CARE
Attending MD Conti:  Resident note reviewed and agree on plan of care and except where noted.  See HPI, PE, and MDM for details.

## 2021-10-14 NOTE — ED PROVIDER NOTE - PATIENT PORTAL LINK FT
You can access the FollowMyHealth Patient Portal offered by [facility] by registering at the following website: [email or web address]. By joining [employer]’s FollowMyHealth portal, you will also be able to view your health information using other applications (apps) compatible with our system.

## 2021-10-14 NOTE — ED PROVIDER NOTE - NSFOLLOWUPINSTRUCTIONS_ED_ALL_ED_FT
Needle Stick Injuries    WHAT YOU NEED TO KNOW:    Needle stick injuries usually happen to healthcare workers in hospitals, clinics, and labs. Needle stick injuries can also happen at home or in the community if needles are not discarded properly. Used needles may have blood or body fluids that carry HIV, the hepatitis B virus (HBV), or the hepatitis C virus (HCV). The virus can spread to a person who gets pricked by a needle used on an infected person.    DISCHARGE INSTRUCTIONS:    Ways that needle stick injuries can occur: Needle stick injuries usually happen by accident. [place] may cause injury to you or to someone else if they were not properly discarded after use. An injury can also occur if you do not use gloves to protect your hands while you work with needles.     Treatment that may be given for needle stick injuries: Postexposure prophylaxis (PEP) may be needed. PEP is treatment that may protect a person from infection after exposure to another person's body fluids. PEP may be needed if the person whose fluids you were exposed to has a known infection. Do not donate blood, organs, tissues, or semen until your follow-up is completed at 6 months.  •PEP for HBV may include HBV vaccinations or medicine to prevent HBV. This treatment works best if started within 24 hours of exposure.     •PEP for HIV may include 2 or 3 types of medicine to prevent HIV. This treatment works best if started within 72 hours of exposure. Continue treatment for 4 weeks. Practice safe sex to prevent spreading HIV and to prevent pregnancy during the follow-up period. If you are breastfeeding, your healthcare provider may recommend that you stop. Ask your healthcare provider if you can breastfeed.     •PEP for HCV is not available. You will need to be tested for HCV and treated if you were infected.    Follow up with your healthcare provider as directed: You will need more blood tests. You will also need to make sure your medicines are working. PEP for HIV often causes side effects. Talk with your healthcare provider about your symptoms. He will need to make sure you are taking the medicine correctly. Write down your questions so you remember to ask them during your visits.    Prevent needle stick injuries:   •Always use gloves when you handle needles that are exposed to blood or other body fluids. You may want to use 2 pairs of gloves for extra protection.    •Do not recap needles after use. Recapping needles increases your risk for a needle stick.    •Throw away needles in a safe container. A hard container with a lid may prevent accidental needle sticks.

## 2021-10-14 NOTE — ED PROVIDER NOTE - PHYSICAL EXAMINATION
Attending MD Sushila:    Gen:  well appearing NAD   Resp: breathing comfortably  Extremities: ankles warm to the touch, no appreciable ankle edema bilaterally  Pysch: appropriate affect    Neuro: moves all extremities spontaneously

## 2021-10-25 DIAGNOSIS — M79.10 MYALGIA, UNSPECIFIED SITE: ICD-10-CM

## 2021-10-27 LAB — SARS-COV-2 N GENE NPH QL NAA+PROBE: NOT DETECTED

## 2021-12-16 DIAGNOSIS — Z11.59 ENCOUNTER FOR SCREENING FOR OTHER VIRAL DISEASES: ICD-10-CM

## 2021-12-23 LAB — SARS-COV-2 N GENE NPH QL NAA+PROBE: NOT DETECTED

## 2021-12-27 ENCOUNTER — NON-APPOINTMENT (OUTPATIENT)
Age: 46
End: 2021-12-27

## 2021-12-30 DIAGNOSIS — R68.89 OTHER GENERAL SYMPTOMS AND SIGNS: ICD-10-CM

## 2022-01-11 DIAGNOSIS — H00.019 HORDEOLUM EXTERNUM UNSPECIFIED EYE, UNSPECIFIED EYELID: ICD-10-CM

## 2022-01-26 ENCOUNTER — APPOINTMENT (OUTPATIENT)
Dept: INTERNAL MEDICINE | Facility: CLINIC | Age: 47
End: 2022-01-26
Payer: COMMERCIAL

## 2022-01-26 VITALS
TEMPERATURE: 98.3 F | WEIGHT: 127 LBS | HEART RATE: 71 BPM | BODY MASS INDEX: 23.37 KG/M2 | SYSTOLIC BLOOD PRESSURE: 106 MMHG | HEIGHT: 62 IN | OXYGEN SATURATION: 99 % | DIASTOLIC BLOOD PRESSURE: 62 MMHG

## 2022-01-26 DIAGNOSIS — Z76.0 ENCOUNTER FOR ISSUE OF REPEAT PRESCRIPTION: ICD-10-CM

## 2022-01-26 PROCEDURE — 99212 OFFICE O/P EST SF 10 MIN: CPT

## 2022-01-26 NOTE — HISTORY OF PRESENT ILLNESS
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

## 2022-01-26 NOTE — PHYSICAL EXAM
[No Acute Distress] : no acute distress [Well Nourished] : well nourished [Well Developed] : well developed [Well-Appearing] : well-appearing [Normal Sclera/Conjunctiva] : normal sclera/conjunctiva [PERRL] : pupils equal round and reactive to light [EOMI] : extraocular movements intact [Normal Outer Ear/Nose] : the outer ears and nose were normal in appearance [Normal Oropharynx] : the oropharynx was normal [No JVD] : no jugular venous distention [No Lymphadenopathy] : no lymphadenopathy [Supple] : supple [No Respiratory Distress] : no respiratory distress  [No Accessory Muscle Use] : no accessory muscle use [Clear to Auscultation] : lungs were clear to auscultation bilaterally [Normal Rate] : normal rate  [Normal S1, S2] : normal S1 and S2 [No Carotid Bruits] : no carotid bruits [No Abdominal Bruit] : a ~M bruit was not heard ~T in the abdomen [Soft] : abdomen soft [Non Tender] : non-tender [Non-distended] : non-distended [Normal Bowel Sounds] : normal bowel sounds [Normal Posterior Cervical Nodes] : no posterior cervical lymphadenopathy [Normal Anterior Cervical Nodes] : no anterior cervical lymphadenopathy [No CVA Tenderness] : no CVA  tenderness [No Spinal Tenderness] : no spinal tenderness [No Joint Swelling] : no joint swelling [Grossly Normal Strength/Tone] : grossly normal strength/tone [No Rash] : no rash [No Skin Lesions] : no skin lesions [Coordination Grossly Intact] : coordination grossly intact [No Focal Deficits] : no focal deficits [Normal Gait] : normal gait [Normal Affect] : the affect was normal [Alert and Oriented x3] : oriented to person, place, and time [Normal Insight/Judgement] : insight and judgment were intact

## 2022-01-26 NOTE — PLAN
[FreeTextEntry1] : \par \par RX refilled, as requested.\par \par I-STOP checked today.\par \par Pt deferred for any labs today.\par Pt deferred for any referrals today.\par \par All questions answered.\par \par Pt is okay with the plan.\par \par pt will call, if anything changes.

## 2022-01-26 NOTE — REVIEW OF SYSTEMS
[Negative] : Heme/Lymph [FreeTextEntry2] : "Migraines and Acid-Reflux". [FreeTextEntry7] : "Migraines and Acid-Reflux". [de-identified] : "Migraines and Acid-Reflux".

## 2022-02-22 DIAGNOSIS — R23.8 OTHER SKIN CHANGES: ICD-10-CM

## 2022-02-22 RX ORDER — KETOCONAZOLE 20.5 MG/ML
2 SHAMPOO, SUSPENSION TOPICAL DAILY
Qty: 1 | Refills: 3 | Status: ACTIVE | COMMUNITY
Start: 2022-02-22 | End: 1900-01-01

## 2022-02-24 ENCOUNTER — NON-APPOINTMENT (OUTPATIENT)
Age: 47
End: 2022-02-24

## 2022-03-14 ENCOUNTER — MED ADMIN CHARGE (OUTPATIENT)
Age: 47
End: 2022-03-14

## 2022-03-14 DIAGNOSIS — Z23 ENCOUNTER FOR IMMUNIZATION: ICD-10-CM

## 2022-03-31 NOTE — ED ADULT NURSE REASSESSMENT NOTE - NIH STROKE SCALE: 10. DYSARTHRIA, QM
[Dear  ___] : Dear  [unfilled], [Consult Letter:] : I had the pleasure of evaluating your patient, [unfilled]. [Please see my note below.] : Please see my note below. [Consult Closing:] : Thank you very much for allowing me to participate in the care of this patient.  If you have any questions, please do not hesitate to contact me. [Sincerely,] : Sincerely, [FreeTextEntry3] : Bill Ellington MD, FACS  (0) Normal

## 2022-04-11 PROBLEM — Z11.59 SCREENING FOR VIRAL DISEASE: Status: ACTIVE | Noted: 2021-12-16

## 2022-04-27 ENCOUNTER — RX RENEWAL (OUTPATIENT)
Age: 47
End: 2022-04-27

## 2022-05-06 ENCOUNTER — RX RENEWAL (OUTPATIENT)
Age: 47
End: 2022-05-06

## 2022-07-11 ENCOUNTER — RX RENEWAL (OUTPATIENT)
Age: 47
End: 2022-07-11

## 2022-07-14 RX ORDER — BUTALBITAL, ACETAMINOPHEN AND CAFFEINE 325; 50; 40 MG/1; MG/1; MG/1
50-325-40 TABLET ORAL
Qty: 30 | Refills: 0 | Status: ACTIVE | COMMUNITY
Start: 1900-01-01 | End: 1900-01-01

## 2022-10-10 LAB — SARS-COV-2 N GENE NPH QL NAA+PROBE: DETECTED

## 2022-12-12 NOTE — ED PROVIDER NOTE - CARDIAC, MLM
Pediatric Well Child Exam: 18 Month of age    Chief Complaint   Patient presents with   • Well Infant Birth to 23 Months       SUBJECTIVE:  Cb Negron is a 18 month old male who presents for a well child exam.  Patient presents  with mother.    Patient's mother has given consent to record this visit for documentation in their clinical record.     Encounter for routine child health examination without abnormal findings (primary encounter diagnosis):    Immunization status: Due for Influenza vaccine and Hepatitis A vaccine.  Growth: Growth wise is 50th percentile. Head circumference between 50th to 85th percentile.      Fever, unspecified fever cause:  Mother states, he has fever since morning and attributes it to teething as he is teething. Bites his fingers. Has cough for a few days. Has not given Tylenol to him this morning. Informs he has a runny nose.   Denies  allergies to immunizations, but mentions he got nodules (“big bumps”) after injections.       CONCERNS RAISED TODAY: fever this am    SLEEP PATTERN:  Hours / Night: 12 hours.    NAPS: Hours / Day: 1.5-2 hours.    DIET/GI:  APPETITE: Good.  MILK: whole 2-3 cups/day.  WATER SUPPLY AT HOME: city.  STOOLS: 2 / day    /HOMECARE:   :  [x]  YES     []  NO    in home with 2 other kids and 1 other child    DEVELOPMENT:  [x]  YES    []  NO     []  UNKNOWN    Seems to see / hear well?  [x]  YES    []  NO     []  UNKNOWN    Runs?  [x]  YES    []  NO     []  UNKNOWN    Sets self in small chair?  [x]  YES    []  NO     []  UNKNOWN    Throws or kicks ball without falling over?  [x]  YES    []  NO     []  UNKNOWN    Scribble on own?  [x]  YES    []  NO     []  UNKNOWN    Removes garments?  [x]  YES    []  NO     []  UNKNOWN    Imitates housework?  [x]  YES    []  NO     []  UNKNOWN    Uses spoon/cup ?  [x]  YES    []  NO     []  UNKNOWN    Points to objects of interest?  [x]  YES    []  NO     []  UNKNOWN    Points to 2-3 objects when  named?  [x]  YES    []  NO     []  UNKNOWN    Points to 3 body parts?  [x]  YES    []  NO     []  UNKNOWN    Points to familiar people when named?  [x]  YES    []  NO     []  UNKNOWN    Uses 10-25 words?  [x]  YES    []  NO     []  UNKNOWN    Engages in pretend play with others?  [x]  YES    []  NO     []  UNKNOWN    Gets upset if something taken away?    OBJECTIVE:  PAST HISTORIES:  Allergies, Medications, Medical history, Surgical history, Family history reviewed and updated.  Toxic Exposure:   Tobacco Use: Not Asked       IMMUNIZATION STATUS: Up to date per review of the electronic health records.   IMMUNIZATION REACTIONS: None  VARICELLA STATUS: confirmed by vaccine administration    RECENT HEALTH EVENTS:  Illnesses: fever today with cough.  Hospitalizations: None.  Injuries or Accidents: None.    REVIEW OF SYSTEMS:    All systems reviewed and negative except as documented in \"Concerns raised\".    PHYSICAL EXAM:      VITAL SIGNS:   Visit Vitals  Temp (!) 101.9 °F (38.8 °C)   Ht 31.5\" (80 cm)   Wt 10.7 kg (23 lb 8 oz)   HC 40.6 cm (15.98\")   BMI 16.65 kg/m²    35 %ile (Z= -0.37) based on WHO (Boys, 0-2 years) weight-for-age data using vitals from 12/12/2022.  GENERAL:  Well appearing male child.  Alert, active and consolable.  Nontoxic in appearance  SKIN:  Warm, normal turgor.  No cyanosis.  No rash.  HEAD:  Normocephalic, atraumatic.    EYES:  Conjunctivae appear normal, noninjected, nonicteric, positive red reflex.  NOSE:  Appears normal but some rhinorrhea  EARS:  Normal external auditory canals. Tympanic membranes are transparent with normal landmarks.  THROAT:  Moist mucous membranes without lesions.  NECK:  Supple, no lymphadenopathy or masses.  HEART:  Regular rate and rhythm.  Normal S1, S2.  No murmurs, rubs, gallops.   LUNGS:  Clear to auscultation.  No wheezes, rales, rhonchi.  Normal work effort with breathing.  ABDOMEN:  Bowel sounds present. Soft, nontender.  No hepatomegaly.  No splenomegaly.  No  masses.  GENITOURINARY: Mart stage 1. Bilateral testes without masses or hernia.  Rectum/anus patent.  EXTREMITIES: Normal bilateral range of motion of upper and lower extremities. Peripheral pulses normal. Equal femoral pulses.  BACK: Spine straight.   NEUROLOGIC:  Normal muscle tone and symmetrical strength. Symmetrical facial motor function.          Assessment:  18 month old male well child.    Plan:     Encounter for routine child health examination without abnormal findings (primary encounter diagnosis):  Growth: Growth is normal and on expected lines.  Recommend Influenza vaccine and Hepatitis A vaccine however will wait till he is doing better in the next couple weeks.  Recommended to child proofing as he is going to be more mobile.  Recommended focusing on a healthy diet for him.  Recommended encouraging speech activities.  Recommended keeping him away from television, costa and video watching as much as possible.    Fever, unspecified fever cause:    Fever more likely due to cough, chest congestion and runny nose than teething.   Recommended continuing with Tylenol to control fever.  Advised to give him enough fluids.  Nasal swab taken and sent for COVID-19/FLU/RSV Panel.      1. All parental concerns and questions discussed.  2. Anticipatory guidance provided, handout/s given Accident prevention: Childproof home, Water Safety  3. Immunizations per orders.  Risks, benefits, and side effects discussed.  4. Orders for immunizations given today per the recommended schedule.   Check rsv, influenza and covid 19.        Follow up:Return in about 6 months (around 6/12/2023) for 2 Year Well Child Examination, needs nurse visit for hep a vaccine and influenza vaccine.     Refer to orders.  Medical compliance with plan discussed and risks of non-compliance reviewed.  Patient education completed on disease process, etiology & prognosis.  Proper usage and side effects of medications reviewed & discussed.  Return to  clinic as clinically indicated as discussed with patient who verbalized understanding of the plan and is in agreement with the plan.    I, Dr. Colin Cortez, have created a visit summary document based on the audio recording between Chris Garg MD and this patient for the physician to review, edit as needed, and authenticate.  Creation Date: 12/12/2022    I have reviewed and edited the visit summary above and attest that it is accurate.       Normal rate, regular rhythm.  Heart sounds S1, S2.  No murmurs, rubs or gallops.

## 2023-04-04 NOTE — ED CDU PROVIDER DISPOSITION NOTE - NSDCPRINTRESULTS_ED_ALL_ED
A flu vaccination is recommended each fall.  Next vaccination to consider is Bexsero for Meningitis Type B for college aged young adults, 2 doses given 1 month apart.  Encourage reading and physical activity.  Limit screen time.  Discussed the importance of school and education.  Encourage good healthy eating choices.  Limit sugar sweetened beverages and soda.  Encourage increasing responsibilities, responsibility with driving, chores and gradual independence.  Be outdoors daily!   Wear helmets and use caution around water to prevent drowning.  Wear sunscreen when outside and reapply often.  Use insect repellant in warmer weather.  Safety and good choices discussed.  Follow-up yearly for a well visit, or sooner as needed.    
Patient requests all Lab and Radiology Results on their Discharge Instructions

## 2023-04-27 ENCOUNTER — APPOINTMENT (OUTPATIENT)
Dept: PLASTIC SURGERY | Facility: CLINIC | Age: 48
End: 2023-04-27

## 2023-04-27 ENCOUNTER — OUTPATIENT (OUTPATIENT)
Dept: OUTPATIENT SERVICES | Facility: HOSPITAL | Age: 48
LOS: 1 days | End: 2023-04-27

## 2023-04-27 VITALS
WEIGHT: 122 LBS | HEART RATE: 77 BPM | BODY MASS INDEX: 22.31 KG/M2 | DIASTOLIC BLOOD PRESSURE: 84 MMHG | RESPIRATION RATE: 14 BRPM | SYSTOLIC BLOOD PRESSURE: 136 MMHG

## 2023-04-28 DIAGNOSIS — Z01.89 ENCOUNTER FOR OTHER SPECIFIED SPECIAL EXAMINATIONS: ICD-10-CM

## 2023-05-19 DIAGNOSIS — N76.0 ACUTE VAGINITIS: ICD-10-CM

## 2023-05-19 DIAGNOSIS — B96.89 ACUTE VAGINITIS: ICD-10-CM

## 2023-07-03 ENCOUNTER — APPOINTMENT (OUTPATIENT)
Dept: OBGYN | Facility: CLINIC | Age: 48
End: 2023-07-03
Payer: COMMERCIAL

## 2023-07-03 VITALS
SYSTOLIC BLOOD PRESSURE: 131 MMHG | HEART RATE: 90 BPM | WEIGHT: 122 LBS | DIASTOLIC BLOOD PRESSURE: 86 MMHG | HEIGHT: 62 IN | BODY MASS INDEX: 22.45 KG/M2

## 2023-07-03 PROCEDURE — 58100 BIOPSY OF UTERUS LINING: CPT

## 2023-07-03 PROCEDURE — 99203 OFFICE O/P NEW LOW 30 MIN: CPT | Mod: 25

## 2023-07-03 RX ORDER — MELOXICAM 7.5 MG/1
7.5 TABLET ORAL
Qty: 30 | Refills: 2 | Status: COMPLETED | COMMUNITY
Start: 2021-03-12 | End: 2023-07-03

## 2023-07-03 RX ORDER — IPRATROPIUM BROMIDE 42 UG/1
0.06 SPRAY NASAL 4 TIMES DAILY
Qty: 1 | Refills: 0 | Status: COMPLETED | COMMUNITY
Start: 2021-07-20 | End: 2023-07-03

## 2023-07-03 RX ORDER — ACETAMINOPHEN 500 MG/1
500 TABLET ORAL
Qty: 0 | Refills: 0 | Status: COMPLETED | OUTPATIENT
Start: 2023-07-03

## 2023-07-03 RX ORDER — DICLOFENAC SODIUM 75 MG/1
75 TABLET, DELAYED RELEASE ORAL
Qty: 30 | Refills: 1 | Status: COMPLETED | COMMUNITY
Start: 2021-01-25 | End: 2023-07-03

## 2023-07-03 RX ORDER — DICLOFENAC SODIUM 100 MG/1
100 TABLET, FILM COATED, EXTENDED RELEASE ORAL
Refills: 0 | Status: COMPLETED | COMMUNITY
End: 2023-07-03

## 2023-07-03 RX ORDER — ERYTHROMYCIN 5 MG/G
5 OINTMENT OPHTHALMIC
Qty: 2 | Refills: 0 | Status: COMPLETED | COMMUNITY
Start: 2022-01-11 | End: 2023-07-03

## 2023-07-03 RX ORDER — FAMOTIDINE 20 MG/1
20 TABLET, FILM COATED ORAL
Qty: 20 | Refills: 0 | Status: COMPLETED | COMMUNITY
Start: 2021-03-22 | End: 2023-07-03

## 2023-07-03 RX ORDER — OFLOXACIN 3 MG/ML
0.3 SOLUTION/ DROPS OPHTHALMIC 4 TIMES DAILY
Qty: 1 | Refills: 0 | Status: COMPLETED | COMMUNITY
Start: 2021-09-29 | End: 2023-07-03

## 2023-07-03 RX ORDER — SODIUM PICOSULFATE, MAGNESIUM OXIDE, AND ANHYDROUS CITRIC ACID 10; 3.5; 12 MG/160ML; G/160ML; G/160ML
10-3.5-12 MG-GM LIQUID ORAL
Qty: 1 | Refills: 0 | Status: COMPLETED | COMMUNITY
Start: 2021-05-27 | End: 2023-07-03

## 2023-07-03 RX ORDER — OMEPRAZOLE 20 MG/1
20 CAPSULE, DELAYED RELEASE ORAL
Qty: 30 | Refills: 1 | Status: COMPLETED | COMMUNITY
Start: 2022-01-26 | End: 2023-07-03

## 2023-07-03 RX ORDER — SODIUM SULFATE, POTASSIUM SULFATE, MAGNESIUM SULFATE 17.5; 3.13; 1.6 G/ML; G/ML; G/ML
17.5-3.13-1.6 SOLUTION, CONCENTRATE ORAL
Qty: 1 | Refills: 0 | Status: COMPLETED | COMMUNITY
Start: 2021-05-27 | End: 2023-07-03

## 2023-07-03 RX ORDER — METHYLPREDNISOLONE 4 MG/1
4 TABLET ORAL
Qty: 1 | Refills: 0 | Status: COMPLETED | COMMUNITY
Start: 2021-03-22 | End: 2023-07-03

## 2023-07-03 RX ORDER — GALCANEZUMAB 100 MG/ML
INJECTION, SOLUTION SUBCUTANEOUS
Qty: 1 | Refills: 2 | Status: COMPLETED | COMMUNITY
Start: 2022-05-06 | End: 2023-07-03

## 2023-07-03 RX ORDER — LINACLOTIDE 72 UG/1
72 CAPSULE, GELATIN COATED ORAL
Qty: 90 | Refills: 0 | Status: COMPLETED | COMMUNITY
Start: 2022-01-26 | End: 2023-07-03

## 2023-07-03 RX ORDER — METRONIDAZOLE 500 MG/1
500 TABLET ORAL
Qty: 4 | Refills: 1 | Status: COMPLETED | COMMUNITY
Start: 2023-05-19 | End: 2023-07-03

## 2023-07-03 RX ORDER — GABAPENTIN 100 MG/1
100 CAPSULE ORAL
Qty: 42 | Refills: 0 | Status: COMPLETED | COMMUNITY
Start: 2021-04-01 | End: 2023-07-03

## 2023-07-03 RX ADMIN — ACETAMINOPHEN 0 MG: 500 TABLET ORAL at 00:00

## 2023-07-05 LAB
C TRACH RRNA SPEC QL NAA+PROBE: NOT DETECTED
N GONORRHOEA RRNA SPEC QL NAA+PROBE: NOT DETECTED
SOURCE AMPLIFICATION: NORMAL

## 2023-07-11 DIAGNOSIS — N93.9 ABNORMAL UTERINE AND VAGINAL BLEEDING, UNSPECIFIED: ICD-10-CM

## 2023-07-11 LAB — CORE LAB BIOPSY: NORMAL

## 2023-07-11 RX ORDER — MEDROXYPROGESTERONE ACETATE 10 MG/1
10 TABLET ORAL DAILY
Qty: 90 | Refills: 1 | Status: ACTIVE | COMMUNITY
Start: 2023-07-11 | End: 1900-01-01

## 2023-07-13 ENCOUNTER — APPOINTMENT (OUTPATIENT)
Dept: OBGYN | Facility: CLINIC | Age: 48
End: 2023-07-13

## 2023-07-17 ENCOUNTER — TRANSCRIPTION ENCOUNTER (OUTPATIENT)
Age: 48
End: 2023-07-17

## 2023-07-17 RX ORDER — SUMATRIPTAN 20 MG/1
20 SPRAY NASAL
Qty: 1 | Refills: 3 | Status: ACTIVE | COMMUNITY
Start: 2021-03-23 | End: 1900-01-01

## 2023-07-18 ENCOUNTER — APPOINTMENT (OUTPATIENT)
Dept: OBGYN | Facility: CLINIC | Age: 48
End: 2023-07-18

## 2023-08-02 ENCOUNTER — TRANSCRIPTION ENCOUNTER (OUTPATIENT)
Age: 48
End: 2023-08-02

## 2023-08-03 ENCOUNTER — TRANSCRIPTION ENCOUNTER (OUTPATIENT)
Age: 48
End: 2023-08-03

## 2023-08-10 ENCOUNTER — APPOINTMENT (OUTPATIENT)
Dept: PLASTIC SURGERY | Facility: CLINIC | Age: 48
End: 2023-08-10

## 2023-09-22 ENCOUNTER — TRANSCRIPTION ENCOUNTER (OUTPATIENT)
Age: 48
End: 2023-09-22

## 2023-09-22 ENCOUNTER — NON-APPOINTMENT (OUTPATIENT)
Age: 48
End: 2023-09-22

## 2023-10-03 ENCOUNTER — APPOINTMENT (OUTPATIENT)
Dept: GASTROENTEROLOGY | Facility: CLINIC | Age: 48
End: 2023-10-03

## 2023-11-02 ENCOUNTER — APPOINTMENT (OUTPATIENT)
Dept: PLASTIC SURGERY | Facility: CLINIC | Age: 48
End: 2023-11-02

## 2023-11-02 ENCOUNTER — OUTPATIENT (OUTPATIENT)
Dept: OUTPATIENT SERVICES | Facility: HOSPITAL | Age: 48
LOS: 1 days | End: 2023-11-02

## 2023-11-03 DIAGNOSIS — Z09 ENCOUNTER FOR FOLLOW-UP EXAMINATION AFTER COMPLETED TREATMENT FOR CONDITIONS OTHER THAN MALIGNANT NEOPLASM: ICD-10-CM

## 2023-12-07 DIAGNOSIS — Z00.00 ENCOUNTER FOR GENERAL ADULT MEDICAL EXAMINATION W/OUT ABNORMAL FINDINGS: ICD-10-CM

## 2023-12-10 LAB
ALBUMIN SERPL ELPH-MCNC: 4.5 G/DL
ALP BLD-CCNC: 59 U/L
ALT SERPL-CCNC: 10 U/L
ANION GAP SERPL CALC-SCNC: 8 MMOL/L
APTT BLD: 28.3 SEC
AST SERPL-CCNC: 12 U/L
BILIRUB SERPL-MCNC: 0.4 MG/DL
BUN SERPL-MCNC: 8 MG/DL
CALCIUM SERPL-MCNC: 9.3 MG/DL
CHLORIDE SERPL-SCNC: 103 MMOL/L
CO2 SERPL-SCNC: 26 MMOL/L
CREAT SERPL-MCNC: 0.63 MG/DL
EGFR: 109 ML/MIN/1.73M2
GLUCOSE SERPL-MCNC: 87 MG/DL
HCG SERPL QL: NEGATIVE
HCT VFR BLD CALC: 36.7 %
HGB BLD-MCNC: 11.8 G/DL
INR PPP: 0.91 RATIO
MCHC RBC-ENTMCNC: 28.9 PG
MCHC RBC-ENTMCNC: 32.2 GM/DL
MCV RBC AUTO: 90 FL
PAPP-A SERPL-ACNC: <1 MIU/ML
PLATELET # BLD AUTO: 441 K/UL
POTASSIUM SERPL-SCNC: 4.3 MMOL/L
PROT SERPL-MCNC: 6.8 G/DL
PT BLD: 10.4 SEC
RBC # BLD: 4.08 M/UL
RBC # FLD: 16 %
SODIUM SERPL-SCNC: 138 MMOL/L
WBC # FLD AUTO: 6.09 K/UL

## 2023-12-12 ENCOUNTER — APPOINTMENT (OUTPATIENT)
Dept: INTERNAL MEDICINE | Facility: CLINIC | Age: 48
End: 2023-12-12
Payer: COMMERCIAL

## 2023-12-12 VITALS
DIASTOLIC BLOOD PRESSURE: 76 MMHG | OXYGEN SATURATION: 99 % | HEIGHT: 62 IN | SYSTOLIC BLOOD PRESSURE: 114 MMHG | BODY MASS INDEX: 22.82 KG/M2 | WEIGHT: 124 LBS | TEMPERATURE: 98.5 F | HEART RATE: 79 BPM

## 2023-12-12 DIAGNOSIS — G43.909 MIGRAINE, UNSPECIFIED, NOT INTRACTABLE, W/OUT STATUS MIGRAINOSUS: ICD-10-CM

## 2023-12-12 DIAGNOSIS — Z01.818 ENCOUNTER FOR OTHER PREPROCEDURAL EXAMINATION: ICD-10-CM

## 2023-12-12 PROCEDURE — 99214 OFFICE O/P EST MOD 30 MIN: CPT

## 2023-12-15 NOTE — ASU PATIENT PROFILE, ADULT - FALL HARM RISK - UNIVERSAL INTERVENTIONS
Bed in lowest position, wheels locked, appropriate side rails in place/Call bell, personal items and telephone in reach/Instruct patient to call for assistance before getting out of bed or chair/Non-slip footwear when patient is out of bed/[place] to call system/Physically safe environment - no spills, clutter or unnecessary equipment/Purposeful Proactive Rounding/Room/bathroom lighting operational, light cord in reach Bed in lowest position, wheels locked, appropriate side rails in place/Call bell, personal items and telephone in reach/Instruct patient to call for assistance before getting out of bed or chair/Non-slip footwear when patient is out of bed/[place] to call system/Physically safe environment - no spills, clutter or unnecessary equipment/Purposeful Proactive Rounding/Room/bathroom lighting operational, light cord in reach

## 2023-12-15 NOTE — ASU PATIENT PROFILE, ADULT - NS PREOP UNDERSTANDS INFO
npo after  10pm, being ID  and insurance card, no valuables or jewelry, wear comfortable clothing Prednisone Counseling:  I discussed with the patient the risks of prolonged use of prednisone including but not limited to weight gain, insomnia, osteoporosis, mood changes, diabetes, susceptibility to infection, glaucoma and high blood pressure.  In cases where prednisone use is prolonged, patients should be monitored with blood pressure checks, serum glucose levels and an eye exam.  Additionally, the patient may need to be placed on GI prophylaxis, PCP prophylaxis, and calcium and vitamin D supplementation and/or a bisphosphonate.  The patient verbalized understanding of the proper use and the possible adverse effects of prednisone.  All of the patient's questions and concerns were addressed.

## 2023-12-16 RX ORDER — DIAZEPAM 5 MG
3 TABLET ORAL
Qty: 3 | Refills: 0
Start: 2023-12-16

## 2023-12-16 RX ORDER — ONDANSETRON 8 MG/1
1 TABLET, FILM COATED ORAL
Qty: 12 | Refills: 0
Start: 2023-12-16

## 2023-12-16 RX ORDER — VALACYCLOVIR 500 MG/1
1 TABLET, FILM COATED ORAL
Qty: 14 | Refills: 0
Start: 2023-12-16 | End: 2023-12-22

## 2023-12-16 RX ORDER — OXYCODONE HYDROCHLORIDE 5 MG/1
1 TABLET ORAL
Qty: 15 | Refills: 0
Start: 2023-12-16

## 2023-12-17 ENCOUNTER — TRANSCRIPTION ENCOUNTER (OUTPATIENT)
Age: 48
End: 2023-12-17

## 2023-12-17 RX ORDER — VALACYCLOVIR 500 MG/1
1 TABLET, FILM COATED ORAL
Qty: 14 | Refills: 0
Start: 2023-12-17 | End: 2023-12-23

## 2023-12-17 RX ORDER — ONDANSETRON 8 MG/1
1 TABLET, FILM COATED ORAL
Qty: 12 | Refills: 0
Start: 2023-12-17

## 2023-12-17 RX ORDER — DIAZEPAM 5 MG
3 TABLET ORAL
Qty: 3 | Refills: 0
Start: 2023-12-17

## 2023-12-17 RX ORDER — OXYCODONE HYDROCHLORIDE 5 MG/1
1 TABLET ORAL
Qty: 15 | Refills: 0
Start: 2023-12-17

## 2023-12-18 ENCOUNTER — OUTPATIENT (OUTPATIENT)
Dept: OUTPATIENT SERVICES | Facility: HOSPITAL | Age: 48
LOS: 1 days | Discharge: HOME | End: 2023-12-18

## 2023-12-18 ENCOUNTER — TRANSCRIPTION ENCOUNTER (OUTPATIENT)
Age: 48
End: 2023-12-18

## 2023-12-18 VITALS
HEART RATE: 86 BPM | SYSTOLIC BLOOD PRESSURE: 113 MMHG | OXYGEN SATURATION: 100 % | HEIGHT: 62 IN | RESPIRATION RATE: 18 BRPM | WEIGHT: 121.25 LBS | DIASTOLIC BLOOD PRESSURE: 68 MMHG | TEMPERATURE: 98 F

## 2023-12-18 VITALS
TEMPERATURE: 98 F | OXYGEN SATURATION: 98 % | HEART RATE: 88 BPM | RESPIRATION RATE: 16 BRPM | DIASTOLIC BLOOD PRESSURE: 80 MMHG | SYSTOLIC BLOOD PRESSURE: 120 MMHG

## 2023-12-18 DIAGNOSIS — Z98.891 HISTORY OF UTERINE SCAR FROM PREVIOUS SURGERY: Chronic | ICD-10-CM

## 2023-12-18 RX ORDER — ONDANSETRON 8 MG/1
1 TABLET, FILM COATED ORAL
Refills: 0 | DISCHARGE

## 2023-12-18 RX ORDER — APREPITANT 80 MG/1
40 CAPSULE ORAL ONCE
Refills: 0 | Status: COMPLETED | OUTPATIENT
Start: 2023-12-18 | End: 2023-12-18

## 2023-12-18 RX ORDER — SUMATRIPTAN SUCCINATE 4 MG/.5ML
1 INJECTION, SOLUTION SUBCUTANEOUS
Refills: 0 | DISCHARGE

## 2023-12-18 RX ORDER — SODIUM CHLORIDE 9 MG/ML
1000 INJECTION, SOLUTION INTRAVENOUS
Refills: 0 | Status: DISCONTINUED | OUTPATIENT
Start: 2023-12-18 | End: 2023-12-18

## 2023-12-18 RX ORDER — ACETAMINOPHEN 500 MG
1000 TABLET ORAL ONCE
Refills: 0 | Status: COMPLETED | OUTPATIENT
Start: 2023-12-18 | End: 2023-12-18

## 2023-12-18 RX ORDER — ALPRAZOLAM 0.25 MG
1 TABLET ORAL
Refills: 0 | DISCHARGE

## 2023-12-18 RX ORDER — FENTANYL CITRATE 50 UG/ML
25 INJECTION INTRAVENOUS
Refills: 0 | Status: DISCONTINUED | OUTPATIENT
Start: 2023-12-18 | End: 2023-12-18

## 2023-12-18 RX ORDER — ONDANSETRON 8 MG/1
4 TABLET, FILM COATED ORAL ONCE
Refills: 0 | Status: DISCONTINUED | OUTPATIENT
Start: 2023-12-18 | End: 2023-12-18

## 2023-12-18 RX ORDER — LABETALOL HCL 100 MG
10 TABLET ORAL ONCE
Refills: 0 | Status: DISCONTINUED | OUTPATIENT
Start: 2023-12-18 | End: 2023-12-18

## 2023-12-18 RX ORDER — OXYCODONE HYDROCHLORIDE 5 MG/1
5 TABLET ORAL ONCE
Refills: 0 | Status: DISCONTINUED | OUTPATIENT
Start: 2023-12-18 | End: 2023-12-18

## 2023-12-18 RX ADMIN — Medication 1000 MILLIGRAM(S): at 16:08

## 2023-12-18 RX ADMIN — FENTANYL CITRATE 25 MICROGRAM(S): 50 INJECTION INTRAVENOUS at 14:30

## 2023-12-18 RX ADMIN — FENTANYL CITRATE 25 MICROGRAM(S): 50 INJECTION INTRAVENOUS at 14:51

## 2023-12-18 RX ADMIN — Medication 400 MILLIGRAM(S): at 15:38

## 2023-12-18 RX ADMIN — APREPITANT 40 MILLIGRAM(S): 80 CAPSULE ORAL at 07:04

## 2023-12-18 RX ADMIN — OXYCODONE HYDROCHLORIDE 5 MILLIGRAM(S): 5 TABLET ORAL at 16:57

## 2023-12-18 RX ADMIN — OXYCODONE HYDROCHLORIDE 5 MILLIGRAM(S): 5 TABLET ORAL at 16:27

## 2023-12-18 RX ADMIN — Medication 1000 MILLIGRAM(S): at 07:07

## 2023-12-18 RX ADMIN — FENTANYL CITRATE 25 MICROGRAM(S): 50 INJECTION INTRAVENOUS at 14:25

## 2023-12-18 RX ADMIN — Medication 1000 MILLIGRAM(S): at 07:04

## 2023-12-18 RX ADMIN — SODIUM CHLORIDE 100 MILLILITER(S): 9 INJECTION, SOLUTION INTRAVENOUS at 15:17

## 2023-12-18 NOTE — ASU DISCHARGE PLAN (ADULT/PEDIATRIC) - NS MD DC FALL RISK RISK
For information on Fall & Injury Prevention, visit: [email or web address] OR  [email or web address] OR  https://www.cdc.gov/steadi/patient.html For information on Fall & Injury Prevention, visit: [email or web address] OR  [email or web address] OR  https://www.cdc.gov/steadi/patient.html

## 2023-12-18 NOTE — BRIEF OPERATIVE NOTE - NSICDXBRIEFPROCEDURE_GEN_ALL_CORE_FT
PROCEDURES:  Facelift with fat grafting, for cosmesis 18-Dec-2023 13:57:06  Bruce Medeiros  Laser resurfacing 18-Dec-2023 13:57:15  Bruce Medeiros

## 2023-12-20 RX ORDER — OXYCODONE HYDROCHLORIDE 5 MG/1
1 TABLET ORAL
Qty: 15 | Refills: 0
Start: 2023-12-20

## 2023-12-26 ENCOUNTER — APPOINTMENT (OUTPATIENT)
Dept: PLASTIC SURGERY | Facility: CLINIC | Age: 48
End: 2023-12-26

## 2023-12-26 ENCOUNTER — OUTPATIENT (OUTPATIENT)
Dept: OUTPATIENT SERVICES | Facility: HOSPITAL | Age: 48
LOS: 1 days | End: 2023-12-26

## 2023-12-26 DIAGNOSIS — Z98.891 HISTORY OF UTERINE SCAR FROM PREVIOUS SURGERY: Chronic | ICD-10-CM

## 2023-12-26 NOTE — ASSESSMENT
[FreeTextEntry1] : JUAN QUEZADA is a 48 year old female who presents 1 week s/p facelift, fat transfer to face, and laser resurfacing of the face, neck, and chest. She reports pain is well controlled. No issues with drainage, fluid collection, or infection.  Exam: Moderate bruising and edema of the bilateral face and neck with focused edema of the right tragus > left, incisions healing well, sensation and motor intact and symmetric Submental incision healing well Laser treated areas of periorbita, nose, mouth, neck, and chest healing well Donor site on L hip appropriate  A/P: 48yoF s/p facelift, fat transfer to face, and laser resurfacing of the face, neck, and chest. Doing well and appropriate for 1 week s/p surgery. Nylon sutures removed today. Postop cares and restrictions reviewed with patient. Will have RTC in 3 weeks.

## 2024-01-02 DIAGNOSIS — Z48.812 ENCOUNTER FOR SURGICAL AFTERCARE FOLLOWING SURGERY ON THE CIRCULATORY SYSTEM: ICD-10-CM

## 2024-01-18 ENCOUNTER — OUTPATIENT (OUTPATIENT)
Dept: OUTPATIENT SERVICES | Facility: HOSPITAL | Age: 49
LOS: 1 days | End: 2024-01-18

## 2024-01-18 ENCOUNTER — APPOINTMENT (OUTPATIENT)
Dept: PLASTIC SURGERY | Facility: CLINIC | Age: 49
End: 2024-01-18

## 2024-01-18 DIAGNOSIS — Z98.891 HISTORY OF UTERINE SCAR FROM PREVIOUS SURGERY: Chronic | ICD-10-CM

## 2024-01-18 NOTE — ASSESSMENT
[FreeTextEntry1] : JUAN QUEZADA is a 49 year old female who presents 1 month s/p face and neck lift, fat transfer to face, and laser resurfacing of the mouth, eyes, neck, and chest. She reports doing well, some continued tenderness and induration in the central neck.   Exam: Incisions healing well Mild bruising and edema of lateral cheeks MIld bruising with tenderness and areas of induration in central neck  A/P: 49yoF 1 month s/p face and neck lift, fat transfer to face, and laser resurfacing of the mouth, eyes, neck, and chest. Doing well. Can resume full face makeup and OK to color hair. Will RTC 1 month.

## 2024-01-19 DIAGNOSIS — Z48.812 ENCOUNTER FOR SURGICAL AFTERCARE FOLLOWING SURGERY ON THE CIRCULATORY SYSTEM: ICD-10-CM

## 2024-02-13 ENCOUNTER — TRANSCRIPTION ENCOUNTER (OUTPATIENT)
Age: 49
End: 2024-02-13

## 2024-02-29 ENCOUNTER — OUTPATIENT (OUTPATIENT)
Dept: OUTPATIENT SERVICES | Facility: HOSPITAL | Age: 49
LOS: 1 days | End: 2024-02-29

## 2024-02-29 ENCOUNTER — APPOINTMENT (OUTPATIENT)
Dept: PLASTIC SURGERY | Facility: CLINIC | Age: 49
End: 2024-02-29

## 2024-02-29 DIAGNOSIS — Z98.891 HISTORY OF UTERINE SCAR FROM PREVIOUS SURGERY: Chronic | ICD-10-CM

## 2024-03-04 DIAGNOSIS — Z48.812 ENCOUNTER FOR SURGICAL AFTERCARE FOLLOWING SURGERY ON THE CIRCULATORY SYSTEM: ICD-10-CM

## 2024-03-24 NOTE — HISTORY OF PRESENT ILLNESS
[FreeTextEntry1] : .JUAN QUEZADA is a 49 year old female who presents 1 month s/p face and neck lift, fat transfer to face, and laser resurfacing of the mouth, eyes, neck, and chest. Feels discoloration and tenderness continue to slowly improve. Also wishes to discuss breast augmentation during today's visit.

## 2024-03-24 NOTE — ASSESSMENT
[FreeTextEntry1] : Exam:  Facelift incisions well healed, mild discoloration still present in lateral face and neck Breast with grade 0 ptosis, no surgical scars or masses  A/P:  49 year old F 2 months facelift with fat transfer and laser resurfacing of the eyes/mouth/neck/chest. Doing well. We also discussed breast augmentation. Will have patient return for formal sizing appointment with discussion of operative details at that point if she would like to proceed.

## 2024-03-26 ENCOUNTER — TRANSCRIPTION ENCOUNTER (OUTPATIENT)
Age: 49
End: 2024-03-26

## 2024-04-06 DIAGNOSIS — N95.9 UNSPECIFIED MENOPAUSAL AND PERIMENOPAUSAL DISORDER: ICD-10-CM

## 2024-04-11 ENCOUNTER — APPOINTMENT (OUTPATIENT)
Dept: PLASTIC SURGERY | Facility: CLINIC | Age: 49
End: 2024-04-11

## 2024-04-11 ENCOUNTER — APPOINTMENT (OUTPATIENT)
Age: 49
End: 2024-04-11
Payer: COMMERCIAL

## 2024-04-11 PROCEDURE — D0140: CPT

## 2024-04-11 PROCEDURE — D0220: CPT

## 2024-04-15 ENCOUNTER — LABORATORY RESULT (OUTPATIENT)
Age: 49
End: 2024-04-15

## 2024-04-17 LAB
ESTRADIOL SERPL-MCNC: 162 PG/ML
FSH SERPL-MCNC: 31.6 IU/L
TSH SERPL-ACNC: 1.27 UIU/ML

## 2024-04-17 RX ORDER — ESTRADIOL AND LEVONORGESTREL .045; .015 MG/D; MG/D
0.045-0.015 PATCH TRANSDERMAL
Qty: 12 | Refills: 3 | Status: ACTIVE | COMMUNITY
Start: 2024-04-17 | End: 1900-01-01

## 2024-04-18 LAB
TESTOST FREE SERPL-MCNC: 3.3 PG/ML
TESTOST SERPL-MCNC: 187 NG/DL

## 2024-04-22 ENCOUNTER — TRANSCRIPTION ENCOUNTER (OUTPATIENT)
Age: 49
End: 2024-04-22

## 2024-04-22 LAB — SHBG-ESOTERIX: 93.7 NMOL/L

## 2024-04-22 RX ORDER — ONABOTULINUMTOXINA 200 [USP'U]/1
200 INJECTION, POWDER, LYOPHILIZED, FOR SOLUTION INTRADERMAL; INTRAMUSCULAR
Qty: 1 | Refills: 3 | Status: ACTIVE | COMMUNITY
Start: 2024-04-22 | End: 1900-01-01

## 2024-04-24 ENCOUNTER — NON-APPOINTMENT (OUTPATIENT)
Age: 49
End: 2024-04-24

## 2024-04-24 ENCOUNTER — RX RENEWAL (OUTPATIENT)
Age: 49
End: 2024-04-24

## 2024-04-24 LAB — DHEA-SULFATE, SERUM: 224 UG/DL

## 2024-04-24 RX ORDER — ELETRIPTAN HYDROBROMIDE 40 MG/1
40 TABLET, FILM COATED ORAL
Qty: 12 | Refills: 3 | Status: ACTIVE | COMMUNITY
Start: 2020-11-13 | End: 1900-01-01

## 2024-05-09 ENCOUNTER — APPOINTMENT (OUTPATIENT)
Dept: OPHTHALMOLOGY | Facility: CLINIC | Age: 49
End: 2024-05-09
Payer: SELF-PAY

## 2024-05-09 PROCEDURE — 00009: CPT | Mod: NC

## 2024-05-10 ENCOUNTER — APPOINTMENT (OUTPATIENT)
Dept: OPHTHALMOLOGY | Facility: CLINIC | Age: 49
End: 2024-05-10

## 2024-05-13 ENCOUNTER — APPOINTMENT (OUTPATIENT)
Age: 49
End: 2024-05-13

## 2024-05-14 ENCOUNTER — APPOINTMENT (OUTPATIENT)
Dept: PAIN MANAGEMENT | Facility: CLINIC | Age: 49
End: 2024-05-14
Payer: COMMERCIAL

## 2024-05-14 PROCEDURE — 99205 OFFICE O/P NEW HI 60 MIN: CPT | Mod: 25

## 2024-05-14 PROCEDURE — 64615 CHEMODENERV MUSC MIGRAINE: CPT

## 2024-05-14 NOTE — PHYSICAL EXAM
[FreeTextEntry1] : Constitutional: No signs of distress. No signs of toxicity.  Neck/spine: Examination of the cervical spine reveals normal range of motion in the neck, no midline tenderness, no paraspinal muscle tenderness, no facet tenderness, negative Spurling's bilaterally. Examination of the lumbar spine reveals normal range of motion including flexion, extension and lateral rotation. No midline tenderness, no paraspinal muscle tenderness, negative facet loading,  no tenderness of sciatic notch, no tenderness of bilateral greater trochanters, MS: Alert and well oriented. Speech fluent. No aphasia. Fund of knowledge intact.  Psychiatric: Mood stable. Motor: Adequate bulk, tone, strength. 5/5 strength DTR: : 2+ b/l biceps, 2+ triceps, 2 + brachioradialis, 2+ patellar, and 2+ ankle reflexes. Plantar responses were flexor bilaterally, no clonus Sensory: intact to primary and secondary modalities;  Cerebellar and gait: intact Eyes: no redness or swelling Pulmonary: no respiratory distress Vascular: no temperature,color changes; no edema Musculoskeletal:  no joint deformities ,  no scoliosis, lordosis, kyphosis Skin: No rash.

## 2024-05-14 NOTE — ASSESSMENT
[FreeTextEntry1] : 49 year F with chronic Migraines having failed multiple prophylactic medications who has been responding well to Botox tx with significant reduction in the intensity and frequency of her Migraines, transferring care from outsider provider. She is past due for tx with increase in intensity and frequency of her Migraines.    - Prior imaging reports review HIE.  -Botox tx performed today w/out AE  -can consider Magnesium glycinate 400 mg daily and Riboflavin 400 mg daily. -Nurtec prn as abortive.  -Trial of Qulipta 60 mg daily.  -fu in 3 months    The patient had the opportunity to ask questions and to provide feedback. All questions were answered accordingly.  The patient verbalized understanding of the management plan.

## 2024-05-14 NOTE — HISTORY OF PRESENT ILLNESS
[FreeTextEntry1] : Ms. JUAN QUEZADA is a 49 year old RH female with Pmhx Anxiety, GERD, chronic Migraines onset at age 31 y/o who is transferring care of her Migraines [facility]  Patient with h/o Migraines onset age 30 and has failed multiple prophylactic medications. She has been on Botox for the last 19 years and has been responding well to treatment.  While on Botox she was having headaches 2x/week on average behind left eye, 10/10 associated with photophobia, phonophobia, osmophobia, nausea, no vomiting, no blurred vision, + aura halo both eyes with eyes open only.   Botox has been helping to decrease the intensity of her headaches. She is past due for Botox treatment and has had constant headaches x 13 days.    Triggers: Sleep, alcohol, weather, stress, anxiety.     Headache Days/Month: Prior to Botox tx: 30  Current: 10 Headache Hours/Day:   Prior to Botox tx:    24 Current : 1  JUAN typically sleeps 6 hours per night, interrupted secondary to headaches.   JUAN has 1 servings of caffeine per day.    Allergies: NKDA  Prior meds include: Topamax AE palps/anxiety, Amitriptyline, Emgality, Aimovig, Imitrex,  Current meds include: Botox Dec 2023, Nurtec 75 mg prn, Relpax prn, Fioricet. Xanax half tab q hs. Vit D, Mag,    Family hx: noncontributory.  Social hx: She is single and has a 19 y/o and lives in [place].  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 She denies smoking, vaping, etoh and illicits.  She is exercising 1-2x/week yoga.

## 2024-05-14 NOTE — PROCEDURE
[Chronic migraine] : Chronic migraine [Consent Signed] : consent signed [Adverse Effects] : no adverse effects [Continue Current Treatment] : continue current treatment [BOTOX 200 Units] : BOTOX 200 units; 5 units per muscle site.  procerus x 1, corragator x 2, frontalis x 4, temporalis x 8, occipitalis x 6, cervical  paraspinal x 4 and trapezius x 6 [FreeTextEntry1] : 5 units b/L crow's feet  40 units wasted [id number] , exp 10/2026

## 2024-05-15 RX ORDER — ATOGEPANT 60 MG/1
60 TABLET ORAL
Qty: 30 | Refills: 5 | Status: ACTIVE | COMMUNITY
Start: 2024-05-14 | End: 1900-01-01

## 2024-05-16 ENCOUNTER — NON-APPOINTMENT (OUTPATIENT)
Age: 49
End: 2024-05-16

## 2024-05-17 ENCOUNTER — APPOINTMENT (OUTPATIENT)
Age: 49
End: 2024-05-17
Payer: COMMERCIAL

## 2024-05-17 ENCOUNTER — APPOINTMENT (OUTPATIENT)
Dept: OPHTHALMOLOGY | Facility: CLINIC | Age: 49
End: 2024-05-17

## 2024-05-17 PROCEDURE — D6010R: CUSTOM

## 2024-05-21 ENCOUNTER — APPOINTMENT (OUTPATIENT)
Age: 49
End: 2024-05-21
Payer: COMMERCIAL

## 2024-05-21 PROCEDURE — 99024 POSTOP FOLLOW-UP VISIT: CPT

## 2024-05-22 ENCOUNTER — APPOINTMENT (OUTPATIENT)
Dept: PAIN MANAGEMENT | Facility: CLINIC | Age: 49
End: 2024-05-22

## 2024-05-31 ENCOUNTER — APPOINTMENT (OUTPATIENT)
Age: 49
End: 2024-05-31
Payer: COMMERCIAL

## 2024-05-31 PROCEDURE — 99024 POSTOP FOLLOW-UP VISIT: CPT

## 2024-06-03 ENCOUNTER — APPOINTMENT (OUTPATIENT)
Dept: INTERNAL MEDICINE | Facility: CLINIC | Age: 49
End: 2024-06-03
Payer: COMMERCIAL

## 2024-06-03 VITALS
TEMPERATURE: 98.6 F | WEIGHT: 119 LBS | HEART RATE: 87 BPM | SYSTOLIC BLOOD PRESSURE: 112 MMHG | BODY MASS INDEX: 21.9 KG/M2 | OXYGEN SATURATION: 98 % | DIASTOLIC BLOOD PRESSURE: 69 MMHG | HEIGHT: 62 IN

## 2024-06-03 DIAGNOSIS — G43.709 CHRONIC MIGRAINE W/OUT AURA, NOT INTRACTABLE, W/OUT STATUS MIGRAINOSUS: ICD-10-CM

## 2024-06-03 DIAGNOSIS — Z00.00 ENCOUNTER FOR GENERAL ADULT MEDICAL EXAMINATION W/OUT ABNORMAL FINDINGS: ICD-10-CM

## 2024-06-03 DIAGNOSIS — F41.9 ANXIETY DISORDER, UNSPECIFIED: ICD-10-CM

## 2024-06-03 PROCEDURE — 99213 OFFICE O/P EST LOW 20 MIN: CPT

## 2024-06-03 RX ORDER — ALPRAZOLAM 0.5 MG/1
0.5 TABLET ORAL
Qty: 30 | Refills: 0 | Status: DISCONTINUED | COMMUNITY
Start: 2022-07-14 | End: 2024-06-03

## 2024-06-03 RX ORDER — ALPRAZOLAM 1 MG/1
1 TABLET ORAL DAILY
Qty: 30 | Refills: 0 | Status: ACTIVE | COMMUNITY
Start: 2024-06-03 | End: 1900-01-01

## 2024-06-03 RX ORDER — TESTOSTERONE
POWDER (GRAM) MISCELLANEOUS
Qty: 20 | Refills: 0 | Status: ACTIVE | COMMUNITY
Start: 2024-04-17 | End: 1900-01-01

## 2024-06-03 RX ORDER — ESCITALOPRAM OXALATE 5 MG/1
5 TABLET ORAL DAILY
Qty: 30 | Refills: 0 | Status: ACTIVE | COMMUNITY
Start: 2024-06-03 | End: 1900-01-01

## 2024-06-03 NOTE — PHYSICAL EXAM
[Well-Appearing] : well-appearing [PERRL] : pupils equal round and reactive to light [No Accessory Muscle Use] : no accessory muscle use [Regular Rhythm] : with a regular rhythm [No Abdominal Bruit] : a ~M bruit was not heard ~T in the abdomen [Non Tender] : non-tender [Normal Gait] : normal gait [Speech Grossly Normal] : speech grossly normal [Normal Mood] : the mood was normal [Normal Insight/Judgement] : insight and judgment were intact [de-identified] : anxious

## 2024-06-03 NOTE — ASSESSMENT
[FreeTextEntry1] : Pt w significant migraines, now w relief on qulipta but feeling very anxious, in addition to depression/bereavement over loss of her mother. She does s/w therapist 1-2x/wk. Amenable to trying Stef 5 mg, along w Xanax 1 mg take half or whole qhs, aware of addictive potential, would like to break the cycle of insomnia and headaches. Incr lexapro as tolerated. REnew Testost crm incr to bid.  HCMs uptodate, having another Taylor this yr.  f/u 1 mo

## 2024-06-03 NOTE — HISTORY OF PRESENT ILLNESS
[FreeTextEntry8] : 48 yo woman w Migraines, gerd, anx/depr/bereavement, menopausal symptoms. Recently migraines severe and unrelenting, seeing Neuro and relief w Qulipta but causing anxiety and panic sensations, also prior has been having anx/depr/bereavement. No SI. Bad reaction to Zoloft recently - anxeity, palps, even though had done well with it before. Sleeping is bad, when she doesn't sleep her HAs are more severe.Using  Xanax .5 takes half or whole tab 1/night or half twice a night w some relief, not every night. Bad rxn (HA) to Ambien in the past. Bad rxn to clonopin. Was on Depakote for HAs in the past, also bad rxn w anxiety. Appetite is poor, she's losing wt. Attirbs to Qulipta (vs anxiety/derp?) Using Climara patch, initially w relief but now occas sweats at night, not severe.

## 2024-06-11 ENCOUNTER — APPOINTMENT (OUTPATIENT)
Age: 49
End: 2024-06-11

## 2024-06-11 ENCOUNTER — APPOINTMENT (OUTPATIENT)
Age: 49
End: 2024-06-11
Payer: COMMERCIAL

## 2024-06-11 DIAGNOSIS — K08.89 OTHER SPECIFIED DISORDERS OF TEETH AND SUPPORTING STRUCTURES: ICD-10-CM

## 2024-06-11 PROCEDURE — D6010: CPT

## 2024-06-11 RX ORDER — OXYCODONE 5 MG/1
5 TABLET ORAL
Qty: 10 | Refills: 0 | Status: ACTIVE | COMMUNITY
Start: 2024-06-11 | End: 1900-01-01

## 2024-06-17 RX ORDER — RIMEGEPANT SULFATE 75 MG/75MG
75 TABLET, ORALLY DISINTEGRATING ORAL
Qty: 16 | Refills: 5 | Status: ACTIVE | COMMUNITY
Start: 2024-06-14 | End: 1900-01-01

## 2024-06-20 ENCOUNTER — APPOINTMENT (OUTPATIENT)
Age: 49
End: 2024-06-20
Payer: COMMERCIAL

## 2024-06-20 PROCEDURE — 99024 POSTOP FOLLOW-UP VISIT: CPT

## 2024-06-26 ENCOUNTER — OUTPATIENT (OUTPATIENT)
Dept: OUTPATIENT SERVICES | Facility: HOSPITAL | Age: 49
LOS: 1 days | End: 2024-06-26

## 2024-06-26 ENCOUNTER — APPOINTMENT (OUTPATIENT)
Dept: PLASTIC SURGERY | Facility: CLINIC | Age: 49
End: 2024-06-26

## 2024-06-26 DIAGNOSIS — Z98.891 HISTORY OF UTERINE SCAR FROM PREVIOUS SURGERY: Chronic | ICD-10-CM

## 2024-06-26 RX ORDER — TRETINOIN 0.1 MG/G
1 GEL TOPICAL
Qty: 1 | Refills: 0
Start: 2024-06-26

## 2024-06-26 RX ORDER — HYDROQUINONE 2 %
1 CREAM (GRAM) TOPICAL
Qty: 1 | Refills: 3
Start: 2024-06-26

## 2024-06-27 DIAGNOSIS — Z48.812 ENCOUNTER FOR SURGICAL AFTERCARE FOLLOWING SURGERY ON THE CIRCULATORY SYSTEM: ICD-10-CM

## 2024-07-15 ENCOUNTER — NON-APPOINTMENT (OUTPATIENT)
Age: 49
End: 2024-07-15

## 2024-07-19 ENCOUNTER — NON-APPOINTMENT (OUTPATIENT)
Age: 49
End: 2024-07-19

## 2024-07-19 ENCOUNTER — APPOINTMENT (OUTPATIENT)
Dept: OPHTHALMOLOGY | Facility: CLINIC | Age: 49
End: 2024-07-19
Payer: SELF-PAY

## 2024-07-19 PROCEDURE — 92499 UNLISTED OPH SVC/PROCEDURE: CPT

## 2024-07-19 PROCEDURE — 92310D: CUSTOM

## 2024-07-22 ENCOUNTER — APPOINTMENT (OUTPATIENT)
Dept: OBGYN | Facility: CLINIC | Age: 49
End: 2024-07-22

## 2024-07-24 ENCOUNTER — APPOINTMENT (OUTPATIENT)
Dept: OBGYN | Facility: CLINIC | Age: 49
End: 2024-07-24

## 2024-07-30 ENCOUNTER — APPOINTMENT (OUTPATIENT)
Dept: OPHTHALMOLOGY | Facility: CLINIC | Age: 49
End: 2024-07-30

## 2024-07-30 ENCOUNTER — RX RENEWAL (OUTPATIENT)
Age: 49
End: 2024-07-30

## 2024-07-30 PROCEDURE — 92331: CPT | Mod: NC

## 2024-08-08 ENCOUNTER — APPOINTMENT (OUTPATIENT)
Dept: OBGYN | Facility: CLINIC | Age: 49
End: 2024-08-08

## 2024-08-08 PROBLEM — N89.8 VAGINAL ITCHING: Status: ACTIVE | Noted: 2024-08-08

## 2024-08-08 PROCEDURE — 99459 PELVIC EXAMINATION: CPT

## 2024-08-08 PROCEDURE — 99214 OFFICE O/P EST MOD 30 MIN: CPT

## 2024-08-08 NOTE — PLAN
[FreeTextEntry1] : 50 y/o perimenopausal with vaginal dryness  Plan: - Affirm collected, recommend aquafor/vaseline, will try topical estrogen if negative - as still menstruating, recommend COCPs for vasomotor symptoms - will f/u results

## 2024-08-08 NOTE — HISTORY OF PRESENT ILLNESS
[FreeTextEntry1] : 48 y/o LMP June 2024 presents with vaginal dryness. Denies new soaps/detergents. Her PCP gave her estrogen patch for hot flushes but she didn't tolerate well Would like to treat hot flushes and vaginal dryness if not related to infection.

## 2024-08-13 DIAGNOSIS — N95.9 UNSPECIFIED MENOPAUSAL AND PERIMENOPAUSAL DISORDER: ICD-10-CM

## 2024-08-13 DIAGNOSIS — Z01.419 ENCOUNTER FOR GYNECOLOGICAL EXAMINATION (GENERAL) (ROUTINE) W/OUT ABNORMAL FINDINGS: ICD-10-CM

## 2024-08-13 DIAGNOSIS — N89.8 OTHER SPECIFIED NONINFLAMMATORY DISORDERS OF VAGINA: ICD-10-CM

## 2024-08-13 RX ORDER — ESTRADIOL 2 MG/1
7.5 SYSTEM VAGINAL
Qty: 1 | Refills: 3 | Status: ACTIVE | COMMUNITY
Start: 2024-08-13 | End: 1900-01-01

## 2024-08-19 ENCOUNTER — APPOINTMENT (OUTPATIENT)
Dept: PAIN MANAGEMENT | Facility: CLINIC | Age: 49
End: 2024-08-19

## 2024-09-10 ENCOUNTER — TRANSCRIPTION ENCOUNTER (OUTPATIENT)
Age: 49
End: 2024-09-10

## 2024-09-10 DIAGNOSIS — N95.1 MENOPAUSAL AND FEMALE CLIMACTERIC STATES: ICD-10-CM

## 2024-09-10 RX ORDER — PROGESTERONE 100 MG/1
100 CAPSULE ORAL
Qty: 90 | Refills: 3 | Status: ACTIVE | COMMUNITY
Start: 2024-09-10 | End: 1900-01-01

## 2024-09-10 RX ORDER — ESTRADIOL 0.1 MG/G
0.1 CREAM VAGINAL
Qty: 1 | Refills: 3 | Status: ACTIVE | COMMUNITY
Start: 2024-09-10 | End: 1900-01-01

## 2024-09-13 ENCOUNTER — APPOINTMENT (OUTPATIENT)
Dept: OPHTHALMOLOGY | Facility: CLINIC | Age: 49
End: 2024-09-13

## 2024-10-24 DIAGNOSIS — K59.09 OTHER CONSTIPATION: ICD-10-CM

## 2024-10-24 RX ORDER — LINACLOTIDE 72 UG/1
72 CAPSULE, GELATIN COATED ORAL
Qty: 90 | Refills: 3 | Status: ACTIVE | COMMUNITY
Start: 2024-10-24 | End: 1900-01-01

## 2024-11-26 ENCOUNTER — TRANSCRIPTION ENCOUNTER (OUTPATIENT)
Age: 49
End: 2024-11-26

## 2024-12-05 ENCOUNTER — APPOINTMENT (OUTPATIENT)
Dept: GASTROENTEROLOGY | Facility: CLINIC | Age: 49
End: 2024-12-05

## 2024-12-17 RX ORDER — RIZATRIPTAN BENZOATE 10 MG/1
10 TABLET ORAL
Qty: 30 | Refills: 1 | Status: ACTIVE | COMMUNITY
Start: 2024-12-17 | End: 1900-01-01

## 2024-12-23 DIAGNOSIS — B00.1 HERPESVIRAL VESICULAR DERMATITIS: ICD-10-CM

## 2024-12-23 RX ORDER — VALACYCLOVIR 500 MG/1
500 TABLET, FILM COATED ORAL
Qty: 40 | Refills: 1 | Status: ACTIVE | COMMUNITY
Start: 2024-12-23 | End: 1900-01-01

## 2025-01-13 ENCOUNTER — NON-APPOINTMENT (OUTPATIENT)
Age: 50
End: 2025-01-13

## 2025-01-13 DIAGNOSIS — R94.31 ABNORMAL ELECTROCARDIOGRAM [ECG] [EKG]: ICD-10-CM

## 2025-01-20 ENCOUNTER — APPOINTMENT (OUTPATIENT)
Dept: CARDIOLOGY | Facility: CLINIC | Age: 50
End: 2025-01-20

## 2025-01-21 ENCOUNTER — APPOINTMENT (OUTPATIENT)
Dept: CARDIOLOGY | Facility: CLINIC | Age: 50
End: 2025-01-21
Payer: COMMERCIAL

## 2025-01-21 PROCEDURE — 93306 TTE W/DOPPLER COMPLETE: CPT

## 2025-01-23 ENCOUNTER — APPOINTMENT (OUTPATIENT)
Dept: PLASTIC SURGERY | Facility: CLINIC | Age: 50
End: 2025-01-23

## 2025-01-27 ENCOUNTER — RX RENEWAL (OUTPATIENT)
Age: 50
End: 2025-01-27

## 2025-01-28 ENCOUNTER — NON-APPOINTMENT (OUTPATIENT)
Age: 50
End: 2025-01-28

## 2025-02-20 DIAGNOSIS — J02.9 ACUTE PHARYNGITIS, UNSPECIFIED: ICD-10-CM

## 2025-02-20 RX ORDER — AZITHROMYCIN 250 MG/1
250 TABLET, FILM COATED ORAL
Qty: 1 | Refills: 0 | Status: ACTIVE | COMMUNITY
Start: 2025-02-20 | End: 1900-01-01

## 2025-03-19 NOTE — ASU DISCHARGE PLAN (ADULT/PEDIATRIC) - NSDISCH_GASTRIC_ENDO_ALL_CORE_FT
Addended by: KELLY BOSWELL on: 3/19/2025 11:28 AM     Modules accepted: Orders    
You might feel 'gassy' or "crampy'' for a few hours. This is because air was put into the stomach during the procedure. However, if you have continued abdominal (stomach) pain or swelling, contact your doctor right away.

## 2025-03-25 RX ORDER — ESTRADIOL/NORETHINDRONE ACETATE TRANSDERMAL SYSTEM .05; .14 MG/D; MG/D
0.05-0.14 PATCH, EXTENDED RELEASE TRANSDERMAL
Qty: 24 | Refills: 3 | Status: ACTIVE | COMMUNITY
Start: 2025-03-25 | End: 1900-01-01

## 2025-04-08 DIAGNOSIS — R92.30 DENSE BREASTS, UNSPECIFIED: ICD-10-CM

## 2025-04-10 ENCOUNTER — APPOINTMENT (OUTPATIENT)
Dept: ULTRASOUND IMAGING | Facility: CLINIC | Age: 50
End: 2025-04-10

## 2025-04-10 ENCOUNTER — APPOINTMENT (OUTPATIENT)
Dept: MAMMOGRAPHY | Facility: CLINIC | Age: 50
End: 2025-04-10

## 2025-05-13 DIAGNOSIS — L20.9 ATOPIC DERMATITIS, UNSPECIFIED: ICD-10-CM

## 2025-05-13 RX ORDER — CLOBETASOL PROPIONATE 0.05 G/100ML
0.05 SHAMPOO TOPICAL DAILY
Qty: 1 | Refills: 0 | Status: ACTIVE | COMMUNITY
Start: 2025-05-13 | End: 1900-01-01

## 2025-05-20 DIAGNOSIS — N95.9 UNSPECIFIED MENOPAUSAL AND PERIMENOPAUSAL DISORDER: ICD-10-CM

## 2025-05-27 LAB
TESTOST FREE SERPL-MCNC: 1.9 PG/ML
TESTOST SERPL-MCNC: 33.9 NG/DL

## 2025-05-29 LAB — SHBG-ESOTERIX: 93.6 NMOL/L

## 2025-05-30 NOTE — ASU PATIENT PROFILE, ADULT - NSTOBACCONEVERSMOKERY/N_GEN_A
AML (acute myeloid leukemia) AML (acute myeloid leukemia) AML (acute myeloid leukemia) AML (acute myeloid leukemia) AML (acute myeloid leukemia) AML (acute myeloid leukemia) AML (acute myeloid leukemia) AML (acute myeloid leukemia) AML (acute myeloid leukemia) AML (acute myeloid leukemia) AML (acute myeloid leukemia) AML (acute myeloid leukemia) AML (acute myeloid leukemia) AML (acute myeloid leukemia) AML (acute myeloid leukemia) AML (acute myeloid leukemia) AML (acute myeloid leukemia) AML (acute myeloid leukemia) AML (acute myeloid leukemia) AML (acute myeloid leukemia) AML (acute myeloid leukemia) AML (acute myeloid leukemia) AML (acute myeloid leukemia) AML (acute myeloid leukemia) AML (acute myeloid leukemia) AML (acute myeloid leukemia) AML (acute myeloid leukemia) AML (acute myeloid leukemia) AML (acute myeloid leukemia) AML (acute myeloid leukemia) AML (acute myeloid leukemia) AML (acute myeloid leukemia) AML (acute myeloid leukemia) No

## 2025-06-12 ENCOUNTER — NON-APPOINTMENT (OUTPATIENT)
Age: 50
End: 2025-06-12

## 2025-06-12 ENCOUNTER — TRANSCRIPTION ENCOUNTER (OUTPATIENT)
Age: 50
End: 2025-06-12

## 2025-06-13 ENCOUNTER — NON-APPOINTMENT (OUTPATIENT)
Age: 50
End: 2025-06-13

## 2025-06-13 RX ORDER — PREDNISONE 20 MG/1
20 TABLET ORAL DAILY
Qty: 10 | Refills: 2 | Status: ACTIVE | COMMUNITY
Start: 2025-06-13 | End: 1900-01-01

## 2025-07-02 ENCOUNTER — RX RENEWAL (OUTPATIENT)
Age: 50
End: 2025-07-02

## 2025-08-01 ENCOUNTER — APPOINTMENT (OUTPATIENT)
Dept: PLASTIC SURGERY | Facility: CLINIC | Age: 50
End: 2025-08-01

## 2025-09-03 DIAGNOSIS — E66.3 OVERWEIGHT: ICD-10-CM

## 2025-09-03 RX ORDER — TIRZEPATIDE 7.5 MG/.5ML
7.5 INJECTION, SOLUTION SUBCUTANEOUS
Qty: 1 | Refills: 2 | Status: ACTIVE | COMMUNITY
Start: 2025-09-03 | End: 1900-01-01

## 2025-09-19 DIAGNOSIS — F51.02 ADJUSTMENT INSOMNIA: ICD-10-CM

## 2025-09-19 RX ORDER — ZOLPIDEM TARTRATE 10 MG/1
10 TABLET ORAL
Qty: 15 | Refills: 3 | Status: ACTIVE | COMMUNITY
Start: 2025-09-19 | End: 1900-01-01

## (undated) DEVICE — SUT MERSILENE 4-0 18" FS-2

## (undated) DEVICE — SUT PDS II 3-0 18" PS-2 UNDYED

## (undated) DEVICE — DRAIN BLAKE 10FR ROUND

## (undated) DEVICE — ELCTR BOVIE TIP BLADE INSULATED 2.8" EDGE WITH SAFETY

## (undated) DEVICE — DRAIN JACKSON PRATT 7MM FLAT FULL NO TROCAR

## (undated) DEVICE — ELCTR BOVIE PENCIL SMOKE EVACUATION

## (undated) DEVICE — PACK FACIALPLASTY

## (undated) DEVICE — SUT VICRYL 4-0 18" PS-2 UNDYED

## (undated) DEVICE — DRSG SURG-O-FLEX 2 X 24"

## (undated) DEVICE — DRSG GAUZE MOISTURIZER 0.5 OZ 4X8

## (undated) DEVICE — SUT PLAIN GUT 5-0 18" P-3

## (undated) DEVICE — SUT PDS II 4-0 18" PS-2 UNDYED

## (undated) DEVICE — DRAIN RESERVOIR FOR JACKSON PRATT 100CC CARDINAL

## (undated) DEVICE — SUT PROLENE 2-0 30" SH

## (undated) DEVICE — GLV 7.5 PROTEXIS (WHITE)

## (undated) DEVICE — WARMING BLANKET LOWER ADULT

## (undated) DEVICE — VENODYNE/SCD SLEEVE CALF MEDIUM

## (undated) DEVICE — SUT ETHILON 5-0 18" P-3

## (undated) DEVICE — PETRI DISH MED 3.5"

## (undated) DEVICE — PREP BETADINE SPONGE STICKS